# Patient Record
Sex: MALE | Race: OTHER | NOT HISPANIC OR LATINO | URBAN - METROPOLITAN AREA
[De-identification: names, ages, dates, MRNs, and addresses within clinical notes are randomized per-mention and may not be internally consistent; named-entity substitution may affect disease eponyms.]

---

## 2017-07-13 ENCOUNTER — EMERGENCY (EMERGENCY)
Facility: HOSPITAL | Age: 22
LOS: 1 days | Discharge: HOME | End: 2017-07-13
Attending: EMERGENCY MEDICINE
Payer: MEDICAID

## 2017-07-13 VITALS
SYSTOLIC BLOOD PRESSURE: 131 MMHG | RESPIRATION RATE: 17 BRPM | HEART RATE: 78 BPM | WEIGHT: 139.99 LBS | OXYGEN SATURATION: 97 % | DIASTOLIC BLOOD PRESSURE: 74 MMHG | HEIGHT: 71 IN | TEMPERATURE: 98 F

## 2017-07-13 DIAGNOSIS — S82.899A OTHER FRACTURE OF UNSPECIFIED LOWER LEG, INITIAL ENCOUNTER FOR CLOSED FRACTURE: ICD-10-CM

## 2017-07-13 PROCEDURE — 73630 X-RAY EXAM OF FOOT: CPT | Mod: 26,LT

## 2017-07-13 PROCEDURE — 99284 EMERGENCY DEPT VISIT MOD MDM: CPT | Mod: 25

## 2017-07-13 PROCEDURE — 73610 X-RAY EXAM OF ANKLE: CPT | Mod: 26,LT

## 2017-07-13 PROCEDURE — 73630 X-RAY EXAM OF FOOT: CPT

## 2017-07-13 PROCEDURE — 29515 APPLICATION SHORT LEG SPLINT: CPT | Mod: LT

## 2017-07-13 PROCEDURE — 29515 APPLICATION SHORT LEG SPLINT: CPT

## 2017-07-13 PROCEDURE — 73590 X-RAY EXAM OF LOWER LEG: CPT | Mod: 26,LT

## 2017-07-13 PROCEDURE — 73610 X-RAY EXAM OF ANKLE: CPT

## 2017-07-13 PROCEDURE — 73590 X-RAY EXAM OF LOWER LEG: CPT

## 2017-07-14 NOTE — ED PROVIDER NOTE - OBJECTIVE STATEMENT
22 y/o M pt with no PMHx and no PSHx presents to ED c/o L ankle pain s/p injury to L ankle x1 day ago. Pt states he was playing basketball when someone jumped into the pt, hitting the lateral aspect of the pt's L leg, and causing the pt to fall towards his R side via a stress eversion-type of mechanism. Pt reports severe L ankle pain with associated L ankle swelling and L ankle bruising; pt is unable to bear weight. Pt denies L knee pain, or any other complaints. NKDA.

## 2017-07-14 NOTE — ED PROVIDER NOTE - LOWER EXTREMITY EXAM, LEFT
full active ROM of all joints in L lower extremity except L ankle; no tenderness to L fibular head; tenderness to distal fibula, L mid foot, and L medial malleolus; neurovascularly intact, capillary refill <2 seconds
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
## 2017-07-17 DIAGNOSIS — S82.492A OTHER FRACTURE OF SHAFT OF LEFT FIBULA, INITIAL ENCOUNTER FOR CLOSED FRACTURE: ICD-10-CM

## 2017-07-17 DIAGNOSIS — S82.52XA DISPLACED FRACTURE OF MEDIAL MALLEOLUS OF LEFT TIBIA, INITIAL ENCOUNTER FOR CLOSED FRACTURE: ICD-10-CM
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
## 2017-07-23 ENCOUNTER — INPATIENT (INPATIENT)
Facility: HOSPITAL | Age: 22
LOS: 0 days | Discharge: HOME | DRG: 494 | End: 2017-07-24
Attending: ORTHOPAEDIC SURGERY | Admitting: ORTHOPAEDIC SURGERY
Payer: MEDICAID

## 2017-07-23 VITALS
WEIGHT: 149.91 LBS | RESPIRATION RATE: 16 BRPM | HEART RATE: 70 BPM | DIASTOLIC BLOOD PRESSURE: 69 MMHG | OXYGEN SATURATION: 99 % | TEMPERATURE: 98 F | HEIGHT: 71 IN | SYSTOLIC BLOOD PRESSURE: 97 MMHG

## 2017-07-23 DIAGNOSIS — S82.899A OTHER FRACTURE OF UNSPECIFIED LOWER LEG, INITIAL ENCOUNTER FOR CLOSED FRACTURE: ICD-10-CM

## 2017-07-23 LAB
ALBUMIN SERPL ELPH-MCNC: 4.2 G/DL — SIGNIFICANT CHANGE UP (ref 3.5–5)
ALP SERPL-CCNC: 76 U/L — SIGNIFICANT CHANGE UP (ref 40–120)
ALT FLD-CCNC: 20 U/L DA — SIGNIFICANT CHANGE UP (ref 10–60)
ANION GAP SERPL CALC-SCNC: 7 MMOL/L — SIGNIFICANT CHANGE UP (ref 5–17)
APTT BLD: 31.2 SEC — SIGNIFICANT CHANGE UP (ref 27.5–37.4)
AST SERPL-CCNC: 17 U/L — SIGNIFICANT CHANGE UP (ref 10–40)
BASOPHILS # BLD AUTO: 0.1 K/UL — SIGNIFICANT CHANGE UP (ref 0–0.2)
BASOPHILS NFR BLD AUTO: 0.9 % — SIGNIFICANT CHANGE UP (ref 0–2)
BILIRUB SERPL-MCNC: 0.4 MG/DL — SIGNIFICANT CHANGE UP (ref 0.2–1.2)
BLD GP AB SCN SERPL QL: SIGNIFICANT CHANGE UP
BUN SERPL-MCNC: 18 MG/DL — SIGNIFICANT CHANGE UP (ref 7–18)
CALCIUM SERPL-MCNC: 8.9 MG/DL — SIGNIFICANT CHANGE UP (ref 8.4–10.5)
CHLORIDE SERPL-SCNC: 106 MMOL/L — SIGNIFICANT CHANGE UP (ref 96–108)
CO2 SERPL-SCNC: 26 MMOL/L — SIGNIFICANT CHANGE UP (ref 22–31)
CREAT SERPL-MCNC: 0.85 MG/DL — SIGNIFICANT CHANGE UP (ref 0.5–1.3)
EOSINOPHIL # BLD AUTO: 0.2 K/UL — SIGNIFICANT CHANGE UP (ref 0–0.5)
EOSINOPHIL NFR BLD AUTO: 2 % — SIGNIFICANT CHANGE UP (ref 0–6)
GLUCOSE SERPL-MCNC: 88 MG/DL — SIGNIFICANT CHANGE UP (ref 70–99)
HCT VFR BLD CALC: 41.8 % — SIGNIFICANT CHANGE UP (ref 39–50)
HGB BLD-MCNC: 14.6 G/DL — SIGNIFICANT CHANGE UP (ref 13–17)
INR BLD: 1.14 RATIO — SIGNIFICANT CHANGE UP (ref 0.88–1.16)
LYMPHOCYTES # BLD AUTO: 2.7 K/UL — SIGNIFICANT CHANGE UP (ref 1–3.3)
LYMPHOCYTES # BLD AUTO: 35.4 % — SIGNIFICANT CHANGE UP (ref 13–44)
MCHC RBC-ENTMCNC: 32.8 PG — SIGNIFICANT CHANGE UP (ref 27–34)
MCHC RBC-ENTMCNC: 35 GM/DL — SIGNIFICANT CHANGE UP (ref 32–36)
MCV RBC AUTO: 93.8 FL — SIGNIFICANT CHANGE UP (ref 80–100)
MONOCYTES # BLD AUTO: 0.5 K/UL — SIGNIFICANT CHANGE UP (ref 0–0.9)
MONOCYTES NFR BLD AUTO: 6.4 % — SIGNIFICANT CHANGE UP (ref 2–14)
NEUTROPHILS # BLD AUTO: 4.2 K/UL — SIGNIFICANT CHANGE UP (ref 1.8–7.4)
NEUTROPHILS NFR BLD AUTO: 55.3 % — SIGNIFICANT CHANGE UP (ref 43–77)
PLATELET # BLD AUTO: 220 K/UL — SIGNIFICANT CHANGE UP (ref 150–400)
POTASSIUM SERPL-MCNC: 4 MMOL/L — SIGNIFICANT CHANGE UP (ref 3.5–5.3)
POTASSIUM SERPL-SCNC: 4 MMOL/L — SIGNIFICANT CHANGE UP (ref 3.5–5.3)
PROT SERPL-MCNC: 7.5 G/DL — SIGNIFICANT CHANGE UP (ref 6–8.3)
PROTHROM AB SERPL-ACNC: 12.5 SEC — SIGNIFICANT CHANGE UP (ref 9.8–12.7)
RBC # BLD: 4.46 M/UL — SIGNIFICANT CHANGE UP (ref 4.2–5.8)
RBC # FLD: 12.2 % — SIGNIFICANT CHANGE UP (ref 10.3–14.5)
SODIUM SERPL-SCNC: 139 MMOL/L — SIGNIFICANT CHANGE UP (ref 135–145)
WBC # BLD: 7.7 K/UL — SIGNIFICANT CHANGE UP (ref 3.8–10.5)
WBC # FLD AUTO: 7.7 K/UL — SIGNIFICANT CHANGE UP (ref 3.8–10.5)

## 2017-07-23 PROCEDURE — 27829 TREAT LOWER LEG JOINT: CPT | Mod: AS

## 2017-07-23 PROCEDURE — 76000 FLUOROSCOPY <1 HR PHYS/QHP: CPT | Mod: 26

## 2017-07-23 PROCEDURE — 99285 EMERGENCY DEPT VISIT HI MDM: CPT

## 2017-07-23 PROCEDURE — 73610 X-RAY EXAM OF ANKLE: CPT | Mod: 26,LT

## 2017-07-23 PROCEDURE — 71020: CPT | Mod: 26

## 2017-07-23 RX ORDER — SODIUM CHLORIDE 9 MG/ML
1000 INJECTION INTRAMUSCULAR; INTRAVENOUS; SUBCUTANEOUS
Qty: 0 | Refills: 0 | Status: DISCONTINUED | OUTPATIENT
Start: 2017-07-23 | End: 2017-07-24

## 2017-07-23 RX ORDER — OXYCODONE HYDROCHLORIDE 5 MG/1
1 TABLET ORAL
Qty: 30 | Refills: 0 | OUTPATIENT
Start: 2017-07-23

## 2017-07-23 RX ORDER — OXYCODONE AND ACETAMINOPHEN 5; 325 MG/1; MG/1
1 TABLET ORAL EVERY 4 HOURS
Qty: 0 | Refills: 0 | Status: DISCONTINUED | OUTPATIENT
Start: 2017-07-23 | End: 2017-07-24

## 2017-07-23 RX ORDER — HYDROMORPHONE HYDROCHLORIDE 2 MG/ML
0.5 INJECTION INTRAMUSCULAR; INTRAVENOUS; SUBCUTANEOUS
Qty: 0 | Refills: 0 | Status: DISCONTINUED | OUTPATIENT
Start: 2017-07-23 | End: 2017-07-23

## 2017-07-23 RX ORDER — ONDANSETRON 8 MG/1
4 TABLET, FILM COATED ORAL ONCE
Qty: 0 | Refills: 0 | Status: COMPLETED | OUTPATIENT
Start: 2017-07-23 | End: 2017-07-23

## 2017-07-23 RX ORDER — ONDANSETRON 8 MG/1
4 TABLET, FILM COATED ORAL ONCE
Qty: 0 | Refills: 0 | Status: DISCONTINUED | OUTPATIENT
Start: 2017-07-23 | End: 2017-07-23

## 2017-07-23 RX ORDER — MORPHINE SULFATE 50 MG/1
4 CAPSULE, EXTENDED RELEASE ORAL ONCE
Qty: 0 | Refills: 0 | Status: DISCONTINUED | OUTPATIENT
Start: 2017-07-23 | End: 2017-07-23

## 2017-07-23 RX ORDER — OXYCODONE AND ACETAMINOPHEN 5; 325 MG/1; MG/1
2 TABLET ORAL EVERY 4 HOURS
Qty: 0 | Refills: 0 | Status: DISCONTINUED | OUTPATIENT
Start: 2017-07-23 | End: 2017-07-24

## 2017-07-23 RX ORDER — MORPHINE SULFATE 50 MG/1
2 CAPSULE, EXTENDED RELEASE ORAL ONCE
Qty: 0 | Refills: 0 | Status: DISCONTINUED | OUTPATIENT
Start: 2017-07-23 | End: 2017-07-23

## 2017-07-23 RX ORDER — CEPHALEXIN 500 MG
1 CAPSULE ORAL
Qty: 20 | Refills: 0 | OUTPATIENT
Start: 2017-07-23 | End: 2017-07-28

## 2017-07-23 RX ORDER — CEFAZOLIN SODIUM 1 G
1000 VIAL (EA) INJECTION ONCE
Qty: 0 | Refills: 0 | Status: COMPLETED | OUTPATIENT
Start: 2017-07-23 | End: 2017-07-23

## 2017-07-23 RX ORDER — ENOXAPARIN SODIUM 100 MG/ML
40 INJECTION SUBCUTANEOUS DAILY
Qty: 0 | Refills: 0 | Status: DISCONTINUED | OUTPATIENT
Start: 2017-07-23 | End: 2017-07-24

## 2017-07-23 RX ORDER — SODIUM CHLORIDE 9 MG/ML
1000 INJECTION, SOLUTION INTRAVENOUS
Qty: 0 | Refills: 0 | Status: DISCONTINUED | OUTPATIENT
Start: 2017-07-23 | End: 2017-07-23

## 2017-07-23 RX ADMIN — MORPHINE SULFATE 2 MILLIGRAM(S): 50 CAPSULE, EXTENDED RELEASE ORAL at 21:33

## 2017-07-23 RX ADMIN — OXYCODONE AND ACETAMINOPHEN 2 TABLET(S): 5; 325 TABLET ORAL at 19:50

## 2017-07-23 RX ADMIN — OXYCODONE AND ACETAMINOPHEN 2 TABLET(S): 5; 325 TABLET ORAL at 23:44

## 2017-07-23 RX ADMIN — SODIUM CHLORIDE 125 MILLILITER(S): 9 INJECTION INTRAMUSCULAR; INTRAVENOUS; SUBCUTANEOUS at 09:58

## 2017-07-23 RX ADMIN — Medication 100 MILLIGRAM(S): at 18:23

## 2017-07-23 RX ADMIN — OXYCODONE AND ACETAMINOPHEN 2 TABLET(S): 5; 325 TABLET ORAL at 19:00

## 2017-07-23 RX ADMIN — ENOXAPARIN SODIUM 40 MILLIGRAM(S): 100 INJECTION SUBCUTANEOUS at 23:13

## 2017-07-23 RX ADMIN — MORPHINE SULFATE 2 MILLIGRAM(S): 50 CAPSULE, EXTENDED RELEASE ORAL at 22:00

## 2017-07-23 NOTE — ED ADULT NURSE NOTE - OBJECTIVE STATEMENT
axox3 ambulated with scratches presented with c/o Lt ankle pain S/P basketball injury 10 days ago  ho swelling or calf tenderness noted  B/W initiated

## 2017-07-23 NOTE — ASU DISCHARGE PLAN (ADULT/PEDIATRIC). - MEDICATION SUMMARY - MEDICATIONS TO STOP TAKING
I will STOP taking the medications listed below when I get home from the hospital:    Naprosyn 500 mg oral tablet  -- 1 tab(s) by mouth every 12 hours  -- Check with your doctor before becoming pregnant.  May cause drowsiness or dizziness.  Obtain medical advice before taking any non-prescription drugs as some may affect the action of this medication.  Take with food or milk.

## 2017-07-23 NOTE — ASU DISCHARGE PLAN (ADULT/PEDIATRIC). - MEDICATION SUMMARY - MEDICATIONS TO TAKE
I will START or STAY ON the medications listed below when I get home from the hospital:    acetaminophen-oxycodone 325 mg-5 mg oral tablet  -- 1 tab(s) by mouth every 6 hours MDD:6  -- Caution federal law prohibits the transfer of this drug to any person other  than the person for whom it was prescribed.  May cause drowsiness.  Alcohol may intensify this effect.  Use care when operating dangerous machinery.  This prescription cannot be refilled.  This product contains acetaminophen.  Do not use  with any other product containing acetaminophen to prevent possible liver damage.  Using more of this medication than prescribed may cause serious breathing problems.    -- Indication: For pain    cephalexin 500 mg oral capsule  -- 1 cap(s) by mouth 4 times a day  -- Finish all this medication unless otherwise directed by prescriber.    -- Indication: For Abx

## 2017-07-23 NOTE — ED ADULT TRIAGE NOTE - CHIEF COMPLAINT QUOTE
left ankle pain, injured while playing basket ball 10 days ago. was told by orthopedic to come here today for surgery.

## 2017-07-23 NOTE — CONSULT NOTE ADULT - SUBJECTIVE AND OBJECTIVE BOX
HPI: Patient is a 22 y/o M pt who presents to ED with c/o left ankle pain/injury. Patient injured his left ankle while playing basketball about 10 days ago. tamia. Pt was seen in the ED and X rays revealed a displaced left ankle fracture. 	    PAST MEDICAL & SURGICAL HISTORY:  No pertinent past medical history  No significant past surgical history    Review of systems: Non Contributory    MEDICATIONS  (STANDING):  morphine  - Injectable 4 milliGRAM(s) IV Push Once  ondansetron Injectable 4 milliGRAM(s) IV Push once  sodium chloride 0.9%. 1000 milliLiter(s) (125 mL/Hr) IV Continuous <Continuous>    Allergies: No known Allergies    Vital Signs Last 24 Hrs  T(C): 36.4 (23 Jul 2017 08:01), Max: 36.4 (23 Jul 2017 08:01)  T(F): 97.6 (23 Jul 2017 08:01), Max: 97.6 (23 Jul 2017 08:01)  HR: 70 (23 Jul 2017 08:01) (70 - 70)  BP: 97/69 (23 Jul 2017 08:01) (97/69 - 97/69)  BP(mean): --  RR: 16 (23 Jul 2017 08:01) (16 - 16)  SpO2: 99% (23 Jul 2017 08:01) (99% - 99%)    Physical Examination:     Musculoskeletal: Left ankle: positive pain to palpation of both medial a and lateral malleolar area. swelling, ecchymosis, decreased range of motion.     Neurovascularly Intact    LABS:                        14.6   7.7   )-----------( 220      ( 23 Jul 2017 09:05 )             41.8     07-23    139  |  106  |  18  ----------------------------<  88  4.0   |  26  |  0.85    Ca    8.9      23 Jul 2017 09:05    TPro  7.5  /  Alb  4.2  /  TBili  0.4  /  DBili  x   /  AST  17  /  ALT  20  /  AlkPhos  76  07-23    PT/INR - ( 23 Jul 2017 09:05 )   PT: 12.5 sec;   INR: 1.14 ratio         PTT - ( 23 Jul 2017 09:05 )  PTT:31.2 sec      RADIOLOGY & ADDITIONAL STUDIES: Left ankle: Displaced distal fibula fracture, Syndesmosis widening, Medial malleolus avulsion fracture    ASSESSMENT: Bimalleolar displaced ankle fracture    PLAN/RECOMMENDATION: ORIF is recommended. Risks, benefits and complications were discussed and explained.     FOLLOW UP: with office, after surgery.

## 2017-07-24 VITALS — OXYGEN SATURATION: 100 % | DIASTOLIC BLOOD PRESSURE: 70 MMHG | HEART RATE: 78 BPM | SYSTOLIC BLOOD PRESSURE: 115 MMHG

## 2017-07-24 PROCEDURE — 71046 X-RAY EXAM CHEST 2 VIEWS: CPT

## 2017-07-24 PROCEDURE — 80053 COMPREHEN METABOLIC PANEL: CPT

## 2017-07-24 PROCEDURE — C1889: CPT

## 2017-07-24 PROCEDURE — 85610 PROTHROMBIN TIME: CPT

## 2017-07-24 PROCEDURE — 99285 EMERGENCY DEPT VISIT HI MDM: CPT | Mod: 25

## 2017-07-24 PROCEDURE — 86850 RBC ANTIBODY SCREEN: CPT

## 2017-07-24 PROCEDURE — 85730 THROMBOPLASTIN TIME PARTIAL: CPT

## 2017-07-24 PROCEDURE — 86900 BLOOD TYPING SEROLOGIC ABO: CPT

## 2017-07-24 PROCEDURE — 85027 COMPLETE CBC AUTOMATED: CPT

## 2017-07-24 PROCEDURE — 86901 BLOOD TYPING SEROLOGIC RH(D): CPT

## 2017-07-24 PROCEDURE — C1713: CPT

## 2017-07-24 PROCEDURE — 73610 X-RAY EXAM OF ANKLE: CPT

## 2017-07-24 PROCEDURE — 76000 FLUOROSCOPY <1 HR PHYS/QHP: CPT

## 2017-07-24 PROCEDURE — 36415 COLL VENOUS BLD VENIPUNCTURE: CPT

## 2017-07-24 RX ORDER — OXYCODONE HYDROCHLORIDE 5 MG/1
1 TABLET ORAL
Qty: 30 | Refills: 0 | OUTPATIENT
Start: 2017-07-24

## 2017-07-24 RX ADMIN — OXYCODONE AND ACETAMINOPHEN 2 TABLET(S): 5; 325 TABLET ORAL at 04:06

## 2017-07-24 RX ADMIN — OXYCODONE AND ACETAMINOPHEN 2 TABLET(S): 5; 325 TABLET ORAL at 08:13

## 2017-07-24 RX ADMIN — OXYCODONE AND ACETAMINOPHEN 2 TABLET(S): 5; 325 TABLET ORAL at 14:59

## 2017-07-24 RX ADMIN — OXYCODONE AND ACETAMINOPHEN 2 TABLET(S): 5; 325 TABLET ORAL at 12:00

## 2017-07-24 RX ADMIN — OXYCODONE AND ACETAMINOPHEN 2 TABLET(S): 5; 325 TABLET ORAL at 05:00

## 2017-07-24 RX ADMIN — OXYCODONE AND ACETAMINOPHEN 2 TABLET(S): 5; 325 TABLET ORAL at 08:42

## 2017-07-24 RX ADMIN — OXYCODONE AND ACETAMINOPHEN 2 TABLET(S): 5; 325 TABLET ORAL at 00:45

## 2017-07-24 NOTE — DISCHARGE NOTE ADULT - CARE PROVIDER_API CALL
Randy Bone), Orthopaedic Surgery  [address] [place] [address]  Phone: [phone]  Fax: [phone]

## 2017-07-24 NOTE — DISCHARGE NOTE ADULT - CARE PLAN
Principal Discharge DX:	Ankle fracture  Goal:	Increase mobility  Instructions for follow-up, activity and diet:	Wound and splint assessment

## 2017-07-24 NOTE — DISCHARGE NOTE ADULT - PATIENT PORTAL LINK FT
“You can access the [facility] Patient Portal, offered by [facility], by registering with the following website: [email or web address]”

## 2017-07-26 DIAGNOSIS — S93.432A SPRAIN OF TIBIOFIBULAR LIGAMENT OF LEFT ANKLE, INITIAL ENCOUNTER: ICD-10-CM

## 2017-07-26 DIAGNOSIS — S82.842A DISPLACED BIMALLEOLAR FRACTURE OF LEFT LOWER LEG, INITIAL ENCOUNTER FOR CLOSED FRACTURE: ICD-10-CM
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
## 2017-09-18 ENCOUNTER — INPATIENT (INPATIENT)
Facility: HOSPITAL | Age: 22
LOS: 5 days | Discharge: HOME HEALTH | DRG: 940 | End: 2017-09-24
Attending: ORTHOPAEDIC SURGERY | Admitting: ORTHOPAEDIC SURGERY
Payer: MEDICAID

## 2017-09-18 VITALS
SYSTOLIC BLOOD PRESSURE: 124 MMHG | WEIGHT: 154.98 LBS | RESPIRATION RATE: 16 BRPM | DIASTOLIC BLOOD PRESSURE: 78 MMHG | OXYGEN SATURATION: 97 % | HEART RATE: 78 BPM | TEMPERATURE: 97 F

## 2017-09-18 DIAGNOSIS — T81.31XD DISRUPTION OF EXTERNAL OPERATION (SURGICAL) WOUND, NOT ELSEWHERE CLASSIFIED, SUBSEQUENT ENCOUNTER: ICD-10-CM

## 2017-09-18 LAB
ALBUMIN SERPL ELPH-MCNC: 4.3 G/DL — SIGNIFICANT CHANGE UP (ref 3.5–5)
ALP SERPL-CCNC: 88 U/L — SIGNIFICANT CHANGE UP (ref 40–120)
ALT FLD-CCNC: 14 U/L DA — SIGNIFICANT CHANGE UP (ref 10–60)
ANION GAP SERPL CALC-SCNC: 7 MMOL/L — SIGNIFICANT CHANGE UP (ref 5–17)
APTT BLD: 30.1 SEC — SIGNIFICANT CHANGE UP (ref 27.5–37.4)
AST SERPL-CCNC: 12 U/L — SIGNIFICANT CHANGE UP (ref 10–40)
BASOPHILS # BLD AUTO: 0.1 K/UL — SIGNIFICANT CHANGE UP (ref 0–0.2)
BASOPHILS NFR BLD AUTO: 0.9 % — SIGNIFICANT CHANGE UP (ref 0–2)
BILIRUB SERPL-MCNC: 0.4 MG/DL — SIGNIFICANT CHANGE UP (ref 0.2–1.2)
BUN SERPL-MCNC: 11 MG/DL — SIGNIFICANT CHANGE UP (ref 7–18)
CALCIUM SERPL-MCNC: 9.4 MG/DL — SIGNIFICANT CHANGE UP (ref 8.4–10.5)
CHLORIDE SERPL-SCNC: 105 MMOL/L — SIGNIFICANT CHANGE UP (ref 96–108)
CO2 SERPL-SCNC: 28 MMOL/L — SIGNIFICANT CHANGE UP (ref 22–31)
CREAT SERPL-MCNC: 0.97 MG/DL — SIGNIFICANT CHANGE UP (ref 0.5–1.3)
EOSINOPHIL # BLD AUTO: 0.1 K/UL — SIGNIFICANT CHANGE UP (ref 0–0.5)
EOSINOPHIL NFR BLD AUTO: 1.3 % — SIGNIFICANT CHANGE UP (ref 0–6)
GLUCOSE SERPL-MCNC: 80 MG/DL — SIGNIFICANT CHANGE UP (ref 70–99)
HCT VFR BLD CALC: 46.1 % — SIGNIFICANT CHANGE UP (ref 39–50)
HGB BLD-MCNC: 15.4 G/DL — SIGNIFICANT CHANGE UP (ref 13–17)
INR BLD: 1.04 RATIO — SIGNIFICANT CHANGE UP (ref 0.88–1.16)
LYMPHOCYTES # BLD AUTO: 2.5 K/UL — SIGNIFICANT CHANGE UP (ref 1–3.3)
LYMPHOCYTES # BLD AUTO: 30.5 % — SIGNIFICANT CHANGE UP (ref 13–44)
MCHC RBC-ENTMCNC: 31.6 PG — SIGNIFICANT CHANGE UP (ref 27–34)
MCHC RBC-ENTMCNC: 33.5 GM/DL — SIGNIFICANT CHANGE UP (ref 32–36)
MCV RBC AUTO: 94.4 FL — SIGNIFICANT CHANGE UP (ref 80–100)
MONOCYTES # BLD AUTO: 0.8 K/UL — SIGNIFICANT CHANGE UP (ref 0–0.9)
MONOCYTES NFR BLD AUTO: 9.7 % — SIGNIFICANT CHANGE UP (ref 2–14)
NEUTROPHILS # BLD AUTO: 4.8 K/UL — SIGNIFICANT CHANGE UP (ref 1.8–7.4)
NEUTROPHILS NFR BLD AUTO: 57.6 % — SIGNIFICANT CHANGE UP (ref 43–77)
PLATELET # BLD AUTO: 201 K/UL — SIGNIFICANT CHANGE UP (ref 150–400)
POTASSIUM SERPL-MCNC: 3.9 MMOL/L — SIGNIFICANT CHANGE UP (ref 3.5–5.3)
POTASSIUM SERPL-SCNC: 3.9 MMOL/L — SIGNIFICANT CHANGE UP (ref 3.5–5.3)
PROT SERPL-MCNC: 8.5 G/DL — HIGH (ref 6–8.3)
PROTHROM AB SERPL-ACNC: 11.4 SEC — SIGNIFICANT CHANGE UP (ref 9.8–12.7)
RBC # BLD: 4.88 M/UL — SIGNIFICANT CHANGE UP (ref 4.2–5.8)
RBC # FLD: 11.5 % — SIGNIFICANT CHANGE UP (ref 10.3–14.5)
SODIUM SERPL-SCNC: 140 MMOL/L — SIGNIFICANT CHANGE UP (ref 135–145)
WBC # BLD: 8.3 K/UL — SIGNIFICANT CHANGE UP (ref 3.8–10.5)
WBC # FLD AUTO: 8.3 K/UL — SIGNIFICANT CHANGE UP (ref 3.8–10.5)

## 2017-09-18 PROCEDURE — 73610 X-RAY EXAM OF ANKLE: CPT | Mod: 26,LT

## 2017-09-18 PROCEDURE — 99285 EMERGENCY DEPT VISIT HI MDM: CPT

## 2017-09-18 RX ORDER — IBUPROFEN 200 MG
600 TABLET ORAL ONCE
Qty: 0 | Refills: 0 | Status: DISCONTINUED | OUTPATIENT
Start: 2017-09-18 | End: 2017-09-18

## 2017-09-18 RX ORDER — OXYCODONE AND ACETAMINOPHEN 5; 325 MG/1; MG/1
1 TABLET ORAL EVERY 4 HOURS
Qty: 0 | Refills: 0 | Status: DISCONTINUED | OUTPATIENT
Start: 2017-09-18 | End: 2017-09-19

## 2017-09-18 RX ORDER — VANCOMYCIN HCL 1 G
1000 VIAL (EA) INTRAVENOUS ONCE
Qty: 0 | Refills: 0 | Status: COMPLETED | OUTPATIENT
Start: 2017-09-18 | End: 2017-09-18

## 2017-09-18 RX ORDER — SODIUM CHLORIDE 9 MG/ML
1000 INJECTION, SOLUTION INTRAVENOUS
Qty: 0 | Refills: 0 | Status: DISCONTINUED | OUTPATIENT
Start: 2017-09-18 | End: 2017-09-19

## 2017-09-18 RX ORDER — OXYCODONE AND ACETAMINOPHEN 5; 325 MG/1; MG/1
2 TABLET ORAL EVERY 6 HOURS
Qty: 0 | Refills: 0 | Status: DISCONTINUED | OUTPATIENT
Start: 2017-09-18 | End: 2017-09-19

## 2017-09-18 RX ADMIN — Medication 250 MILLIGRAM(S): at 17:05

## 2017-09-18 NOTE — ED PROVIDER NOTE - ATTENDING CONTRIBUTION TO CARE
Tete - 23yo M w ORIF of the L distal fibula 2 mos ago, few days of pain/swelling/erythema and pus DC from surgical wound. Sent in buy Ortho for wash-out. 1cm dehiscence on exam, diffuse erythema and edema to L leg, lateral aspect. Will get labs, Xrays, abx, admit

## 2017-09-18 NOTE — H&P ADULT - HISTORY OF PRESENT ILLNESS
23y/o M s/p orif left ankle 7/23/17 presents today with left ankle drainage from lateral incision for past 5 days. Patient states he noticed a scab on his outer left ankle at incision area last Wednesday which came off. When scab came off, patient states pus and blood started to drain. He then went to orthopedic office next day who advised patient to come to ER. Patient denies any fall or trauma since surgery. He states he has been walking with cam walker boot and crutches. Denies any fever/chills, N/V/D, paresthesias.

## 2017-09-18 NOTE — H&P ADULT - PROBLEM SELECTOR PLAN 1
1. pain management  2. Recommend I&D of Left Ankle wound  3. For OR today  4. Keep NPO with IV Fluids  5. Consent obtained  6. Case discussed with Dr. Bone

## 2017-09-18 NOTE — ED PROVIDER NOTE - MUSCULOSKELETAL MINIMAL EXAM
RANGE OF MOTION LIMITED/motor intact/1.0 ulceration to L lateral malleolus no hardware exposure, warm to touch, no heat, streaking, minimal purulent drainage/TENDERNESS motor intact/1.0 ulceration to L lateral malleolus no hardware exposure, warm/hot to touch, no streaking, minimal purulent drainage with swelling/TENDERNESS/RANGE OF MOTION LIMITED

## 2017-09-18 NOTE — ED PROVIDER NOTE - MEDICAL DECISION MAKING DETAILS
23y/o male, sent to ED for admission for L lateral malleolus pain/wound dehiscences s/p ORIF 7/23/17; consulted with surgery PA and Dr. Bone, pt comfortable, labs and xray ordered.

## 2017-09-18 NOTE — ED PROVIDER NOTE - CADM POA URETHRAL CATHETER
I called patient to see if he could come in and see Dr. Roberto Paul on Monday 6/19/17 and to call Lindsay to set that up. No

## 2017-09-18 NOTE — ED PROVIDER NOTE - OBJECTIVE STATEMENT
21 y/o male, no significant pmhx, psx L ankle ORIF (7/23/17) sent to ED from Dr. Bone office for assessment s/p purulent drainage x1 week. Denies fever/chills, new trauma or any other concerns.

## 2017-09-18 NOTE — ED PROVIDER NOTE - SKIN WOUND DESCRIPTION
clean/DEVITALIZED TISSUE PRESENT/1.0 ulceration clean/DEVITALIZED TISSUE PRESENT/1.0 ulceration, warm/hot to touch minimal purulent drainage, TTP, no streaking, 2+ pulse

## 2017-09-18 NOTE — H&P ADULT - NSHPPHYSICALEXAM_GEN_ALL_CORE
Left Ankle: Incision on medial aspect healed. Wound dehiscence on distal aspect of lateral incision. Minimal pus drainage. No active bleeding. Swelling and erythema around lateral incision. Decreased sensation on dorsal and plantar aspect of foot. 2+ pulses in DP. ROM decreased due to pain.

## 2017-09-18 NOTE — ED ADULT NURSE NOTE - OBJECTIVE STATEMENT
AOX3 +ambulatory patient stated he got ORIF last july and patient stated site with purulent drainage x 1 week. Patient noted with redness and open wound denies any fevers

## 2017-09-18 NOTE — ED PROVIDER NOTE - CARE PLAN
Principal Discharge DX:	Wound dehiscence, surgical, subsequent encounter  Secondary Diagnosis:	Left ankle pain, unspecified chronicity

## 2017-09-19 PROCEDURE — 88300 SURGICAL PATH GROSS: CPT | Mod: 26

## 2017-09-19 RX ORDER — DOCUSATE SODIUM 100 MG
100 CAPSULE ORAL THREE TIMES A DAY
Qty: 0 | Refills: 0 | Status: DISCONTINUED | OUTPATIENT
Start: 2017-09-20 | End: 2017-09-24

## 2017-09-19 RX ORDER — SODIUM CHLORIDE 9 MG/ML
1000 INJECTION, SOLUTION INTRAVENOUS
Qty: 0 | Refills: 0 | Status: DISCONTINUED | OUTPATIENT
Start: 2017-09-19 | End: 2017-09-19

## 2017-09-19 RX ORDER — ONDANSETRON 8 MG/1
4 TABLET, FILM COATED ORAL EVERY 6 HOURS
Qty: 0 | Refills: 0 | Status: DISCONTINUED | OUTPATIENT
Start: 2017-09-19 | End: 2017-09-24

## 2017-09-19 RX ORDER — OXYCODONE HYDROCHLORIDE 5 MG/1
10 TABLET ORAL EVERY 6 HOURS
Qty: 0 | Refills: 0 | Status: DISCONTINUED | OUTPATIENT
Start: 2017-09-20 | End: 2017-09-24

## 2017-09-19 RX ORDER — ACETAMINOPHEN 500 MG
1000 TABLET ORAL ONCE
Qty: 0 | Refills: 0 | Status: COMPLETED | OUTPATIENT
Start: 2017-09-19 | End: 2017-09-19

## 2017-09-19 RX ORDER — ACETAMINOPHEN 500 MG
650 TABLET ORAL EVERY 6 HOURS
Qty: 0 | Refills: 0 | Status: DISCONTINUED | OUTPATIENT
Start: 2017-09-20 | End: 2017-09-24

## 2017-09-19 RX ORDER — OXYCODONE HYDROCHLORIDE 5 MG/1
5 TABLET ORAL EVERY 4 HOURS
Qty: 0 | Refills: 0 | Status: DISCONTINUED | OUTPATIENT
Start: 2017-09-20 | End: 2017-09-24

## 2017-09-19 RX ORDER — FERROUS SULFATE 325(65) MG
325 TABLET ORAL
Qty: 0 | Refills: 0 | Status: DISCONTINUED | OUTPATIENT
Start: 2017-09-20 | End: 2017-09-24

## 2017-09-19 RX ORDER — FOLIC ACID 0.8 MG
1 TABLET ORAL DAILY
Qty: 0 | Refills: 0 | Status: DISCONTINUED | OUTPATIENT
Start: 2017-09-20 | End: 2017-09-24

## 2017-09-19 RX ORDER — HYDROMORPHONE HYDROCHLORIDE 2 MG/ML
0.5 INJECTION INTRAMUSCULAR; INTRAVENOUS; SUBCUTANEOUS
Qty: 0 | Refills: 0 | Status: DISCONTINUED | OUTPATIENT
Start: 2017-09-19 | End: 2017-09-20

## 2017-09-19 RX ORDER — VANCOMYCIN HCL 1 G
1000 VIAL (EA) INTRAVENOUS EVERY 12 HOURS
Qty: 0 | Refills: 0 | Status: DISCONTINUED | OUTPATIENT
Start: 2017-09-20 | End: 2017-09-22

## 2017-09-19 RX ORDER — ENOXAPARIN SODIUM 100 MG/ML
40 INJECTION SUBCUTANEOUS EVERY 24 HOURS
Qty: 0 | Refills: 0 | Status: DISCONTINUED | OUTPATIENT
Start: 2017-09-20 | End: 2017-09-24

## 2017-09-19 RX ORDER — ASCORBIC ACID 60 MG
500 TABLET,CHEWABLE ORAL
Qty: 0 | Refills: 0 | Status: DISCONTINUED | OUTPATIENT
Start: 2017-09-20 | End: 2017-09-24

## 2017-09-19 RX ADMIN — HYDROMORPHONE HYDROCHLORIDE 0.5 MILLIGRAM(S): 2 INJECTION INTRAMUSCULAR; INTRAVENOUS; SUBCUTANEOUS at 23:56

## 2017-09-19 RX ADMIN — Medication 400 MILLIGRAM(S): at 23:56

## 2017-09-19 NOTE — PROGRESS NOTE ADULT - SUBJECTIVE AND OBJECTIVE BOX
Procedure: I&D of left ankle wound dehiscence (s/p ORIF 2 months ago) today                          15.4   8.3   )-----------( 201      ( 18 Sep 2017 17:08 )             46.1       PT/INR - ( 18 Sep 2017 17:08 )   PT: 11.4 sec;   INR: 1.04 ratio         PTT - ( 18 Sep 2017 17:08 )  PTT:30.1 sec    09-18    140  |  105  |  11  ----------------------------<  80  3.9   |  28  |  0.97    Ca    9.4      18 Sep 2017 17:08    TPro  8.5<H>  /  Alb  4.3  /  TBili  0.4  /  DBili  x   /  AST  12  /  ALT  14  /  AlkPhos  88  09-18        History and Physical Complete? [x] Yes [ ] No    Consent Signed [x] Yes [ ] No    Clearance on chart? [x] Yes [ ] No    NPO  [x ] Yes [ ] No  IVF [x ] Yes [ ] No  AntiCoag Held [x] Yes [ ] No    21 y/o MF, s/p ORIF Left ankle fracture, has wound dehiscence. For OR (I&D) today. Offers no acute compalints. Pain mild- well controlled. Left LE dressed, elevated onto 2 pillows Procedure: I&D of left ankle wound dehiscence (s/p ORIF 2 months ago) today                          15.4   8.3   )-----------( 201      ( 18 Sep 2017 17:08 )             46.1       PT/INR - ( 18 Sep 2017 17:08 )   PT: 11.4 sec;   INR: 1.04 ratio         PTT - ( 18 Sep 2017 17:08 )  PTT:30.1 sec    09-18    140  |  105  |  11  ----------------------------<  80  3.9   |  28  |  0.97    Ca    9.4      18 Sep 2017 17:08    TPro  8.5<H>  /  Alb  4.3  /  TBili  0.4  /  DBili  x   /  AST  12  /  ALT  14  /  AlkPhos  88  09-18        History and Physical Complete? [x] Yes [ ] No    Consent Signed [x] Yes [ ] No    Clearance on chart? [x] Yes [ ] No    NPO  [x ] Yes [ ] No  IVF [x ] Yes [ ] No  AntiCoag Held [x] Yes [ ] No    23 y/o M, s/p ORIF Left ankle fracture, has wound dehiscence. For OR (I&D) today. Offers no acute compalints. Pain mild- well controlled. Left LE dressed, elevated onto 2 pillows

## 2017-09-20 LAB
ANION GAP SERPL CALC-SCNC: 5 MMOL/L — SIGNIFICANT CHANGE UP (ref 5–17)
BUN SERPL-MCNC: 9 MG/DL — SIGNIFICANT CHANGE UP (ref 7–18)
CALCIUM SERPL-MCNC: 9.2 MG/DL — SIGNIFICANT CHANGE UP (ref 8.4–10.5)
CHLORIDE SERPL-SCNC: 103 MMOL/L — SIGNIFICANT CHANGE UP (ref 96–108)
CO2 SERPL-SCNC: 31 MMOL/L — SIGNIFICANT CHANGE UP (ref 22–31)
CREAT SERPL-MCNC: 0.99 MG/DL — SIGNIFICANT CHANGE UP (ref 0.5–1.3)
CRP SERPL-MCNC: 1.4 MG/DL — HIGH (ref 0–0.4)
GLUCOSE SERPL-MCNC: 77 MG/DL — SIGNIFICANT CHANGE UP (ref 70–99)
GRAM STN FLD: SIGNIFICANT CHANGE UP
HCT VFR BLD CALC: 44.6 % — SIGNIFICANT CHANGE UP (ref 39–50)
HGB BLD-MCNC: 16 G/DL — SIGNIFICANT CHANGE UP (ref 13–17)
MCHC RBC-ENTMCNC: 33.1 PG — SIGNIFICANT CHANGE UP (ref 27–34)
MCHC RBC-ENTMCNC: 35.8 GM/DL — SIGNIFICANT CHANGE UP (ref 32–36)
MCV RBC AUTO: 92.3 FL — SIGNIFICANT CHANGE UP (ref 80–100)
PLATELET # BLD AUTO: 212 K/UL — SIGNIFICANT CHANGE UP (ref 150–400)
POTASSIUM SERPL-MCNC: 3.8 MMOL/L — SIGNIFICANT CHANGE UP (ref 3.5–5.3)
POTASSIUM SERPL-SCNC: 3.8 MMOL/L — SIGNIFICANT CHANGE UP (ref 3.5–5.3)
RBC # BLD: 4.83 M/UL — SIGNIFICANT CHANGE UP (ref 4.2–5.8)
RBC # FLD: 11.6 % — SIGNIFICANT CHANGE UP (ref 10.3–14.5)
SODIUM SERPL-SCNC: 139 MMOL/L — SIGNIFICANT CHANGE UP (ref 135–145)
SPECIMEN SOURCE: SIGNIFICANT CHANGE UP
WBC # BLD: 9.8 K/UL — SIGNIFICANT CHANGE UP (ref 3.8–10.5)
WBC # FLD AUTO: 9.8 K/UL — SIGNIFICANT CHANGE UP (ref 3.8–10.5)

## 2017-09-20 PROCEDURE — 99233 SBSQ HOSP IP/OBS HIGH 50: CPT

## 2017-09-20 RX ADMIN — OXYCODONE HYDROCHLORIDE 10 MILLIGRAM(S): 5 TABLET ORAL at 10:13

## 2017-09-20 RX ADMIN — Medication 500 MILLIGRAM(S): at 17:00

## 2017-09-20 RX ADMIN — OXYCODONE HYDROCHLORIDE 10 MILLIGRAM(S): 5 TABLET ORAL at 11:01

## 2017-09-20 RX ADMIN — Medication 325 MILLIGRAM(S): at 17:00

## 2017-09-20 RX ADMIN — ONDANSETRON 4 MILLIGRAM(S): 8 TABLET, FILM COATED ORAL at 00:01

## 2017-09-20 RX ADMIN — Medication 1 TABLET(S): at 11:37

## 2017-09-20 RX ADMIN — Medication 250 MILLIGRAM(S): at 17:01

## 2017-09-20 RX ADMIN — Medication 1 MILLIGRAM(S): at 11:37

## 2017-09-20 RX ADMIN — Medication 325 MILLIGRAM(S): at 11:37

## 2017-09-20 RX ADMIN — Medication 1000 MILLIGRAM(S): at 00:11

## 2017-09-20 RX ADMIN — Medication 250 MILLIGRAM(S): at 05:22

## 2017-09-20 RX ADMIN — Medication 500 MILLIGRAM(S): at 05:22

## 2017-09-20 RX ADMIN — ENOXAPARIN SODIUM 40 MILLIGRAM(S): 100 INJECTION SUBCUTANEOUS at 11:37

## 2017-09-20 RX ADMIN — OXYCODONE HYDROCHLORIDE 10 MILLIGRAM(S): 5 TABLET ORAL at 18:12

## 2017-09-20 RX ADMIN — OXYCODONE HYDROCHLORIDE 5 MILLIGRAM(S): 5 TABLET ORAL at 19:58

## 2017-09-20 RX ADMIN — Medication 100 MILLIGRAM(S): at 05:22

## 2017-09-20 RX ADMIN — HYDROMORPHONE HYDROCHLORIDE 0.5 MILLIGRAM(S): 2 INJECTION INTRAMUSCULAR; INTRAVENOUS; SUBCUTANEOUS at 00:11

## 2017-09-20 RX ADMIN — OXYCODONE HYDROCHLORIDE 5 MILLIGRAM(S): 5 TABLET ORAL at 20:30

## 2017-09-20 RX ADMIN — OXYCODONE HYDROCHLORIDE 10 MILLIGRAM(S): 5 TABLET ORAL at 16:35

## 2017-09-20 NOTE — PHYSICAL THERAPY INITIAL EVALUATION ADULT - PERTINENT HX OF CURRENT PROBLEM, REHAB EVAL
21y/o M s/p orif left ankle 7/23/17 presented to ED with left ankle drainage from lateral incision for past 5 days. Underwent incision and drainage yesterday.

## 2017-09-20 NOTE — PROGRESS NOTE ADULT - SUBJECTIVE AND OBJECTIVE BOX
Ortho Note POD# 1  Diagnosis: 22yMale S/p L ankle I&D and ARSLAN POD# 1, is observed and evaluated at bedside. Offers no acute complaints. Pain mild (4/10); well controlled. Awaiting PT for ambulation. Pt denies any CP/SOB/N/V/F/Chills/numbness/tingling.     Vital Signs Last 24 Hrs  T(C): 36.8 (20 Sep 2017 05:51), Max: 36.8 (20 Sep 2017 05:51)  T(F): 98.3 (20 Sep 2017 05:51), Max: 98.3 (20 Sep 2017 05:51)  HR: 104 (20 Sep 2017 09:31) (52 - 104)  BP: 127/76 (20 Sep 2017 09:31) (103/59 - 136/94)  BP(mean): 79 (20 Sep 2017 00:26) (79 - 106)  RR: 16 (20 Sep 2017 05:51) (12 - 16)  SpO2: 97% (20 Sep 2017 09:31) (90% - 100%)    PE: Gen: NAD, laying comfortably in bed  Left lower extremity:   In nl. alignment. Splint intact. Wound dressing clean/dry/intact. NV intact, moves all toes, BCR, No swelling, SILT.                           16.0   9.8   )-----------( 212      ( 20 Sep 2017 08:20 )             44.6   09-20    139  |  103  |  9   ----------------------------<  77  3.8   |  31  |  0.99    Ca    9.2      20 Sep 2017 08:20    TPro  8.5<H>  /  Alb  4.3  /  TBili  0.4  /  DBili  x   /  AST  12  /  ALT  14  /  AlkPhos  88  09-18      A/P : 22yMale S/p L ankle I&D and ARSLAN POD# 1  -    Pain control  -    DVT ppx: SCD on the unaffected side and lovenox  -    PT: NWB on LLE with crutches  -    Resume home meds  -   Incentive spirometer  -   C/w Antibx  -   Keep dressing clean/dry/intact  -   D/w - ID will f/u  -    Keep the operated extremity elevated onto 2 pillows  -    Case d/w Dr. Bone

## 2017-09-21 LAB — VANCOMYCIN TROUGH SERPL-MCNC: 5.1 UG/ML — LOW (ref 10–20)

## 2017-09-21 RX ADMIN — OXYCODONE HYDROCHLORIDE 5 MILLIGRAM(S): 5 TABLET ORAL at 08:25

## 2017-09-21 RX ADMIN — OXYCODONE HYDROCHLORIDE 10 MILLIGRAM(S): 5 TABLET ORAL at 20:14

## 2017-09-21 RX ADMIN — Medication 500 MILLIGRAM(S): at 18:02

## 2017-09-21 RX ADMIN — Medication 325 MILLIGRAM(S): at 09:02

## 2017-09-21 RX ADMIN — Medication 250 MILLIGRAM(S): at 05:17

## 2017-09-21 RX ADMIN — Medication 325 MILLIGRAM(S): at 12:16

## 2017-09-21 RX ADMIN — Medication 325 MILLIGRAM(S): at 18:02

## 2017-09-21 RX ADMIN — Medication 1 MILLIGRAM(S): at 12:16

## 2017-09-21 RX ADMIN — OXYCODONE HYDROCHLORIDE 10 MILLIGRAM(S): 5 TABLET ORAL at 00:04

## 2017-09-21 RX ADMIN — ENOXAPARIN SODIUM 40 MILLIGRAM(S): 100 INJECTION SUBCUTANEOUS at 12:16

## 2017-09-21 RX ADMIN — OXYCODONE HYDROCHLORIDE 10 MILLIGRAM(S): 5 TABLET ORAL at 21:20

## 2017-09-21 RX ADMIN — Medication 100 MILLIGRAM(S): at 13:50

## 2017-09-21 RX ADMIN — Medication 1 TABLET(S): at 12:16

## 2017-09-21 RX ADMIN — Medication 250 MILLIGRAM(S): at 17:55

## 2017-09-21 RX ADMIN — OXYCODONE HYDROCHLORIDE 5 MILLIGRAM(S): 5 TABLET ORAL at 09:00

## 2017-09-21 RX ADMIN — OXYCODONE HYDROCHLORIDE 10 MILLIGRAM(S): 5 TABLET ORAL at 00:48

## 2017-09-21 NOTE — CONSULT NOTE ADULT - ASSESSMENT
acute osteomyelitis of left ankle  infected hardware of left ankle - s/p removal  plan - PICC line  cont vanco 1 gm iv q 12 hrs for now until we have sensitivities of staph aureus

## 2017-09-21 NOTE — CONSULT NOTE ADULT - NEGATIVE GASTROINTESTINAL SYMPTOMS
no diarrhea/no change in bowel habits/no vomiting/no nausea no abdominal pain/no vomiting/no nausea/no diarrhea

## 2017-09-21 NOTE — CONSULT NOTE ADULT - SUBJECTIVE AND OBJECTIVE BOX
HPI:  23 y/o M s/p ORIF left ankle 7/23/17 presented with left ankle drainage from lateral incision x 1 week. Pt underwent I &D of left ankle and removal of hardware on 9/19/17. No Fevers and no WBC. Awaiting surgical Culture Results     PAST MEDICAL & SURGICAL HISTORY:  No pertinent past medical history  No significant past surgical history      No Known Allergies      Meds:  enoxaparin Injectable 40 milliGRAM(s) SubCutaneous every 24 hours  acetaminophen   Tablet 650 milliGRAM(s) Oral every 6 hours PRN  oxyCODONE    IR 10 milliGRAM(s) Oral every 6 hours PRN  oxyCODONE    IR 5 milliGRAM(s) Oral every 4 hours PRN  ondansetron Injectable 4 milliGRAM(s) IV Push every 6 hours PRN  docusate sodium 100 milliGRAM(s) Oral three times a day  ferrous    sulfate 325 milliGRAM(s) Oral three times a day with meals  folic acid 1 milliGRAM(s) Oral daily  multivitamin 1 Tablet(s) Oral daily  ascorbic acid 500 milliGRAM(s) Oral two times a day  vancomycin  IVPB 1000 milliGRAM(s) IV Intermittent every 12 hours      SOCIAL HISTORY:  Smoker:  YES / NO        PACK YEARS:                         WHEN QUIT?  ETOH use:  YES / NO               FREQUENCY / QUANTITY:  Ilicit Drug use:  YES / NO  Occupation:  Assisted device use (Cane / Walker):  Live with:    FAMILY HISTORY:      VITALS:  Vital Signs Last 24 Hrs  T(C): 36.9 (21 Sep 2017 05:13), Max: 36.9 (21 Sep 2017 05:13)  T(F): 98.4 (21 Sep 2017 05:13), Max: 98.4 (21 Sep 2017 05:13)  HR: 88 (21 Sep 2017 05:13) (74 - 88)  BP: 111/63 (21 Sep 2017 05:13) (109/74 - 111/63)  BP(mean): --  RR: 16 (21 Sep 2017 05:13) (16 - 16)  SpO2: 98% (21 Sep 2017 05:13) (98% - 98%)    LABS/DIAGNOSTIC TESTS:                          16.0   9.8   )-----------( 212      ( 20 Sep 2017 08:20 )             44.6     WBC Count: 9.8 K/uL (09-20 @ 08:20)  WBC Count: 8.3 K/uL (09-18 @ 17:08)      09-20    139  |  103  |  9   ----------------------------<  77  3.8   |  31  |  0.99    Ca    9.2      20 Sep 2017 08:20      Radiology:  < from: Xray Ankle Complete 3 Views, Left (09.18.17 @ 17:00) >    EXAM:  ANKLE LEFT (MINIMUM 3 V)                            PROCEDURE DATE:  09/18/2017          INTERPRETATION:  Left ankle. Patient has pain laterally.    Patient had a distal fibular fracture demonstrated on July 23 and had   subsequent fixation.    3 views obtained.    Extensive hardware transfixes the fibular fracture and there are signs of   healing and good alignment.    There is also horizontal pinning device going from the fibula across the   tibia.    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    Small fracture fragment off the medial malleolus could be from an old   event.    IMPRESSION: Signs of healing.                FLORENTIN KANG M.D., ATTENDING RADIOLOGIST  This document has been electronically signed. Sep 19 2017  9:49AM                < end of copied text > HPI:  23 y/o M s/p ORIF left ankle 7/23/17 presented with left ankle drainage from lateral incision x 1 week. Pt underwent I &D of left ankle and removal of hardware on 9/19/17. No Fevers and no WBC. Awaiting surgical Culture Results     PAST MEDICAL & SURGICAL HISTORY:  No pertinent past medical history  No significant past surgical history      No Known Allergies      Meds:  enoxaparin Injectable 40 milliGRAM(s) SubCutaneous every 24 hours  acetaminophen   Tablet 650 milliGRAM(s) Oral every 6 hours PRN  oxyCODONE    IR 10 milliGRAM(s) Oral every 6 hours PRN  oxyCODONE    IR 5 milliGRAM(s) Oral every 4 hours PRN  ondansetron Injectable 4 milliGRAM(s) IV Push every 6 hours PRN  docusate sodium 100 milliGRAM(s) Oral three times a day  ferrous    sulfate 325 milliGRAM(s) Oral three times a day with meals  folic acid 1 milliGRAM(s) Oral daily  multivitamin 1 Tablet(s) Oral daily  ascorbic acid 500 milliGRAM(s) Oral two times a day  vancomycin  IVPB 1000 milliGRAM(s) IV Intermittent every 12 hours      SOCIAL HISTORY:  Smoker:  YES, some day smoker about 1/2 pack  ETOH use:  Socially    VITALS:  Vital Signs Last 24 Hrs  T(C): 36.9 (21 Sep 2017 05:13), Max: 36.9 (21 Sep 2017 05:13)  T(F): 98.4 (21 Sep 2017 05:13), Max: 98.4 (21 Sep 2017 05:13)  HR: 88 (21 Sep 2017 05:13) (74 - 88)  BP: 111/63 (21 Sep 2017 05:13) (109/74 - 111/63)  BP(mean): --  RR: 16 (21 Sep 2017 05:13) (16 - 16)  SpO2: 98% (21 Sep 2017 05:13) (98% - 98%)    LABS/DIAGNOSTIC TESTS:                          16.0   9.8   )-----------( 212      ( 20 Sep 2017 08:20 )             44.6     WBC Count: 9.8 K/uL (09-20 @ 08:20)  WBC Count: 8.3 K/uL (09-18 @ 17:08)      09-20    139  |  103  |  9   ----------------------------<  77  3.8   |  31  |  0.99    Ca    9.2      20 Sep 2017 08:20      Radiology:  < from: Xray Ankle Complete 3 Views, Left (09.18.17 @ 17:00) >    EXAM:  ANKLE LEFT (MINIMUM 3 V)                            PROCEDURE DATE:  09/18/2017          INTERPRETATION:  Left ankle. Patient has pain laterally.    Patient had a distal fibular fracture demonstrated on July 23 and had   subsequent fixation.    3 views obtained.    Extensive hardware transfixes the fibular fracture and there are signs of   healing and good alignment.    There is also horizontal pinning device going from the fibula across the   tibia.    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    Small fracture fragment off the medial malleolus could be from an old   event.    IMPRESSION: Signs of healing.                FLORENTIN KANG M.D., ATTENDING RADIOLOGIST  This document has been electronically signed. Sep 19 2017  9:49AM                < end of copied text > HPI:  23 y/o M s/p ORIF left ankle 7/23/17 presented with left ankle drainage from lateral incision x 1 week. Pt underwent I &D of left ankle and removal of hardware on 9/19/17. No Fevers and no WBC. Awaiting surgical Culture Results final  Culture - Surgical Swab (09.20.17 @ 12:05)    Specimen Source: .Surgical Swab Left Ankle Culture #3    Culture Results:   Numerous Staphylococcus aureus        PAST MEDICAL & SURGICAL HISTORY:  No pertinent past medical history  No significant past surgical history      No Known Allergies      Meds:  enoxaparin Injectable 40 milliGRAM(s) SubCutaneous every 24 hours  acetaminophen   Tablet 650 milliGRAM(s) Oral every 6 hours PRN  oxyCODONE    IR 10 milliGRAM(s) Oral every 6 hours PRN  oxyCODONE    IR 5 milliGRAM(s) Oral every 4 hours PRN  ondansetron Injectable 4 milliGRAM(s) IV Push every 6 hours PRN  docusate sodium 100 milliGRAM(s) Oral three times a day  ferrous    sulfate 325 milliGRAM(s) Oral three times a day with meals  folic acid 1 milliGRAM(s) Oral daily  multivitamin 1 Tablet(s) Oral daily  ascorbic acid 500 milliGRAM(s) Oral two times a day  vancomycin  IVPB 1000 milliGRAM(s) IV Intermittent every 12 hours      SOCIAL HISTORY:  Smoker:  YES, some day smoker about 1/2 pack  ETOH use:  Socially    VITALS:  Vital Signs Last 24 Hrs  T(C): 36.9 (21 Sep 2017 05:13), Max: 36.9 (21 Sep 2017 05:13)  T(F): 98.4 (21 Sep 2017 05:13), Max: 98.4 (21 Sep 2017 05:13)  HR: 88 (21 Sep 2017 05:13) (74 - 88)  BP: 111/63 (21 Sep 2017 05:13) (109/74 - 111/63)  BP(mean): --  RR: 16 (21 Sep 2017 05:13) (16 - 16)  SpO2: 98% (21 Sep 2017 05:13) (98% - 98%)    LABS/DIAGNOSTIC TESTS:                          16.0   9.8   )-----------( 212      ( 20 Sep 2017 08:20 )             44.6     WBC Count: 9.8 K/uL (09-20 @ 08:20)  WBC Count: 8.3 K/uL (09-18 @ 17:08)      09-20    139  |  103  |  9   ----------------------------<  77  3.8   |  31  |  0.99    Ca    9.2      20 Sep 2017 08:20      Radiology:  < from: Xray Ankle Complete 3 Views, Left (09.18.17 @ 17:00) >    EXAM:  ANKLE LEFT (MINIMUM 3 V)                            PROCEDURE DATE:  09/18/2017          INTERPRETATION:  Left ankle. Patient has pain laterally.    Patient had a distal fibular fracture demonstrated on July 23 and had   subsequent fixation.    3 views obtained.    Extensive hardware transfixes the fibular fracture and there are signs of   healing and good alignment.    There is also horizontal pinning device going from the fibula across the   tibia.    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    Small fracture fragment off the medial malleolus could be from an old   event.    IMPRESSION: Signs of healing.                FLORENTIN KANG M.D., ATTENDING RADIOLOGIST  This document has been electronically signed. Sep 19 2017  9:49AM                < end of copied text > HPI:  23 y/o M s/p ORIF left ankle 7/23/17 presented with left ankle drainage from lateral incision x 1 week. Pt underwent I &D of left ankle and removal of hardware on 9/19/17. No Fevers and no leukocytosis but does have pain in his left ankle. He had pus over the hardware hence was removed. Pt is growing out staph aureus from wound cultures, sensitivities are pending.         PAST MEDICAL & SURGICAL HISTORY:  No pertinent past medical history  No significant past surgical history      No Known Allergies      Meds:  enoxaparin Injectable 40 milliGRAM(s) SubCutaneous every 24 hours  acetaminophen   Tablet 650 milliGRAM(s) Oral every 6 hours PRN  oxyCODONE    IR 10 milliGRAM(s) Oral every 6 hours PRN  oxyCODONE    IR 5 milliGRAM(s) Oral every 4 hours PRN  ondansetron Injectable 4 milliGRAM(s) IV Push every 6 hours PRN  docusate sodium 100 milliGRAM(s) Oral three times a day  ferrous    sulfate 325 milliGRAM(s) Oral three times a day with meals  folic acid 1 milliGRAM(s) Oral daily  multivitamin 1 Tablet(s) Oral daily  ascorbic acid 500 milliGRAM(s) Oral two times a day  vancomycin  IVPB 1000 milliGRAM(s) IV Intermittent every 12 hours      SOCIAL HISTORY:  Smoker:  YES, some day smoker about 1/2 pack  ETOH use:  Socially    VITALS:  Vital Signs Last 24 Hrs  T(C): 36.9 (21 Sep 2017 05:13), Max: 36.9 (21 Sep 2017 05:13)  T(F): 98.4 (21 Sep 2017 05:13), Max: 98.4 (21 Sep 2017 05:13)  HR: 88 (21 Sep 2017 05:13) (74 - 88)  BP: 111/63 (21 Sep 2017 05:13) (109/74 - 111/63)  BP(mean): --  RR: 16 (21 Sep 2017 05:13) (16 - 16)  SpO2: 98% (21 Sep 2017 05:13) (98% - 98%)    LABS/DIAGNOSTIC TESTS:                          16.0   9.8   )-----------( 212      ( 20 Sep 2017 08:20 )             44.6     WBC Count: 9.8 K/uL (09-20 @ 08:20)  WBC Count: 8.3 K/uL (09-18 @ 17:08)      09-20    139  |  103  |  9   ----------------------------<  77  3.8   |  31  |  0.99    Ca    9.2      20 Sep 2017 08:20      Radiology:  < from: Xray Ankle Complete 3 Views, Left (09.18.17 @ 17:00) >    EXAM:  ANKLE LEFT (MINIMUM 3 V)                            PROCEDURE DATE:  09/18/2017          INTERPRETATION:  Left ankle. Patient has pain laterally.    Patient had a distal fibular fracture demonstrated on July 23 and had   subsequent fixation.    3 views obtained.    Extensive hardware transfixes the fibular fracture and there are signs of   healing and good alignment.    There is also horizontal pinning device going from the fibula across the   tibia.    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    Small fracture fragment off the medial malleolus could be from an old   event.    IMPRESSION: Signs of healing.                FLORENTIN KANG M.D., ATTENDING RADIOLOGIST  This document has been electronically signed. Sep 19 2017  9:49AM                < end of copied text >

## 2017-09-21 NOTE — PROGRESS NOTE ADULT - SUBJECTIVE AND OBJECTIVE BOX
Orthopedics    Dx: S/p I&D L ankle with removal of hardware POD#2    Pt seen and evaluated at bedside with no acute complaints.  Wound vac intact. pain 2/10 of L ankle now.  Pt denies Chest pain, SOB, dyspnea, paresthesias, N/V/D, abdominal pain, syncope, or pain anywhere else.     ICU Vital Signs Last 24 Hrs  T(C): 36.9 (21 Sep 2017 05:13), Max: 36.9 (21 Sep 2017 05:13)  T(F): 98.4 (21 Sep 2017 05:13), Max: 98.4 (21 Sep 2017 05:13)  HR: 88 (21 Sep 2017 05:13) (74 - 104)  BP: 111/63 (21 Sep 2017 05:13) (109/74 - 127/76)  BP(mean): --  ABP: --  ABP(mean): --  RR: 16 (21 Sep 2017 05:13) (16 - 16)  SpO2: 98% (21 Sep 2017 05:13) (97% - 98%)    PE:  general: AAOx3 NAD. Stable  L Ankle: Wound vac drain intact with no leakage. ACE bandage intact with no drainage. Dressing c/d. Skin pink warm. Medial mall wound with stitches intact, no drainage. healing well. Lateral mall wound with wound vac over it. SILT. Good cap refill. 2+ pulses. NVI    Impression:  23 y/o M S/p I&D L ankle with removal of hardware POD#2  Plan:  - Pain control  - Dvt ppx  - c/w Vanco IV abx as per ID  - Dr Brower on the case  - Wound vac change Friday (qMWF)  - Keep LLE Elevated on 2 pillows: Orthopedics    Dx: S/p I&D L ankle with removal of hardware POD#2    Pt seen and evaluated at bedside with no acute complaints.  Wound vac intact. pain 2/10 of L ankle now.  Pt denies Chest pain, SOB, dyspnea, paresthesias, N/V/D, abdominal pain, syncope, or pain anywhere else.     ICU Vital Signs Last 24 Hrs  T(C): 36.9 (21 Sep 2017 05:13), Max: 36.9 (21 Sep 2017 05:13)  T(F): 98.4 (21 Sep 2017 05:13), Max: 98.4 (21 Sep 2017 05:13)  HR: 88 (21 Sep 2017 05:13) (74 - 104)  BP: 111/63 (21 Sep 2017 05:13) (109/74 - 127/76)  BP(mean): --  ABP: --  ABP(mean): --  RR: 16 (21 Sep 2017 05:13) (16 - 16)  SpO2: 98% (21 Sep 2017 05:13) (97% - 98%)    PE:  general: AAOx3 NAD. Stable  L Ankle: Wound vac drain intact with no leakage. ACE bandage intact with no drainage. Dressing c/d. Skin pink warm. Medial mall wound with stitches intact, no drainage. healing well. Lateral mall wound with wound vac over it. SILT. Good cap refill. 2+ pulses. NVI    Wound Cx(intra-op): NGTD   Blood cx: No growth    Impression:  21 y/o M S/p I&D L ankle with removal of hardware POD#2  Plan:  - Pain control  - Dvt ppx  - c/w Vanco IV abx as per ID  - Dr Brower on the case  - Wound vac change Friday (qMWF)  - Keep LLE Elevated on 2 pillows:

## 2017-09-21 NOTE — CONSULT NOTE ADULT - NEUROLOGICAL DETAILS
responds to pain/sensation intact/responds to verbal commands/deep reflexes intact/alert and oriented x 3 normal strength/alert and oriented x 3

## 2017-09-21 NOTE — CONSULT NOTE ADULT - RS GEN PE MLT RESP DETAILS PC
no rhonchi/good air movement/respirations non-labored/airway patent/breath sounds equal/no rales no rhonchi/clear to auscultation bilaterally/no rales/no wheezes/good air movement

## 2017-09-22 LAB
-  AMPICILLIN/SULBACTAM: SIGNIFICANT CHANGE UP
-  AMPICILLIN/SULBACTAM: SIGNIFICANT CHANGE UP
-  CEFAZOLIN: SIGNIFICANT CHANGE UP
-  CEFAZOLIN: SIGNIFICANT CHANGE UP
-  CIPROFLOXACIN: SIGNIFICANT CHANGE UP
-  CIPROFLOXACIN: SIGNIFICANT CHANGE UP
-  CLINDAMYCIN: SIGNIFICANT CHANGE UP
-  CLINDAMYCIN: SIGNIFICANT CHANGE UP
-  ERYTHROMYCIN: SIGNIFICANT CHANGE UP
-  ERYTHROMYCIN: SIGNIFICANT CHANGE UP
-  GENTAMICIN: SIGNIFICANT CHANGE UP
-  GENTAMICIN: SIGNIFICANT CHANGE UP
-  LEVOFLOXACIN: SIGNIFICANT CHANGE UP
-  LEVOFLOXACIN: SIGNIFICANT CHANGE UP
-  MOXIFLOXACIN(AEROBIC): SIGNIFICANT CHANGE UP
-  MOXIFLOXACIN(AEROBIC): SIGNIFICANT CHANGE UP
-  OXACILLIN: SIGNIFICANT CHANGE UP
-  OXACILLIN: SIGNIFICANT CHANGE UP
-  RIFAMPIN: SIGNIFICANT CHANGE UP
-  RIFAMPIN: SIGNIFICANT CHANGE UP
-  TETRACYCLINE: SIGNIFICANT CHANGE UP
-  TETRACYCLINE: SIGNIFICANT CHANGE UP
-  TRIMETHOPRIM/SULFAMETHOXAZOLE: SIGNIFICANT CHANGE UP
-  TRIMETHOPRIM/SULFAMETHOXAZOLE: SIGNIFICANT CHANGE UP
-  VANCOMYCIN: SIGNIFICANT CHANGE UP
-  VANCOMYCIN: SIGNIFICANT CHANGE UP
METHOD TYPE: SIGNIFICANT CHANGE UP
METHOD TYPE: SIGNIFICANT CHANGE UP

## 2017-09-22 PROCEDURE — 36569 INSJ PICC 5 YR+ W/O IMAGING: CPT

## 2017-09-22 PROCEDURE — 76937 US GUIDE VASCULAR ACCESS: CPT | Mod: 26

## 2017-09-22 PROCEDURE — 77001 FLUOROGUIDE FOR VEIN DEVICE: CPT | Mod: 26

## 2017-09-22 RX ORDER — CEFTRIAXONE 500 MG/1
2 INJECTION, POWDER, FOR SOLUTION INTRAMUSCULAR; INTRAVENOUS ONCE
Qty: 0 | Refills: 0 | Status: COMPLETED | OUTPATIENT
Start: 2017-09-22 | End: 2017-09-22

## 2017-09-22 RX ORDER — ASPIRIN/CALCIUM CARB/MAGNESIUM 324 MG
1 TABLET ORAL
Qty: 30 | Refills: 0 | OUTPATIENT
Start: 2017-09-22 | End: 2017-10-22

## 2017-09-22 RX ORDER — SODIUM CHLORIDE 9 MG/ML
20 INJECTION INTRAMUSCULAR; INTRAVENOUS; SUBCUTANEOUS ONCE
Qty: 0 | Refills: 0 | Status: DISCONTINUED | OUTPATIENT
Start: 2017-09-22 | End: 2017-09-24

## 2017-09-22 RX ORDER — SODIUM CHLORIDE 9 MG/ML
10 INJECTION INTRAMUSCULAR; INTRAVENOUS; SUBCUTANEOUS EVERY 12 HOURS
Qty: 0 | Refills: 0 | Status: DISCONTINUED | OUTPATIENT
Start: 2017-09-22 | End: 2017-09-24

## 2017-09-22 RX ORDER — KETOROLAC TROMETHAMINE 30 MG/ML
30 SYRINGE (ML) INJECTION ONCE
Qty: 0 | Refills: 0 | Status: DISCONTINUED | OUTPATIENT
Start: 2017-09-22 | End: 2017-09-22

## 2017-09-22 RX ORDER — SODIUM CHLORIDE 9 MG/ML
10 INJECTION INTRAMUSCULAR; INTRAVENOUS; SUBCUTANEOUS
Qty: 0 | Refills: 0 | Status: DISCONTINUED | OUTPATIENT
Start: 2017-09-22 | End: 2017-09-24

## 2017-09-22 RX ORDER — CEFTRIAXONE 500 MG/1
1 INJECTION, POWDER, FOR SOLUTION INTRAMUSCULAR; INTRAVENOUS
Qty: 42 | Refills: 0 | OUTPATIENT
Start: 2017-09-22 | End: 2017-11-03

## 2017-09-22 RX ORDER — CEFTRIAXONE 500 MG/1
2 INJECTION, POWDER, FOR SOLUTION INTRAMUSCULAR; INTRAVENOUS EVERY 24 HOURS
Qty: 0 | Refills: 0 | Status: DISCONTINUED | OUTPATIENT
Start: 2017-09-23 | End: 2017-09-24

## 2017-09-22 RX ORDER — CEFTRIAXONE 500 MG/1
INJECTION, POWDER, FOR SOLUTION INTRAMUSCULAR; INTRAVENOUS
Qty: 0 | Refills: 0 | Status: DISCONTINUED | OUTPATIENT
Start: 2017-09-22 | End: 2017-09-24

## 2017-09-22 RX ORDER — KETOROLAC TROMETHAMINE 30 MG/ML
15 SYRINGE (ML) INJECTION ONCE
Qty: 0 | Refills: 0 | Status: DISCONTINUED | OUTPATIENT
Start: 2017-09-22 | End: 2017-09-22

## 2017-09-22 RX ADMIN — OXYCODONE HYDROCHLORIDE 10 MILLIGRAM(S): 5 TABLET ORAL at 21:04

## 2017-09-22 RX ADMIN — OXYCODONE HYDROCHLORIDE 10 MILLIGRAM(S): 5 TABLET ORAL at 05:15

## 2017-09-22 RX ADMIN — Medication 1 TABLET(S): at 12:44

## 2017-09-22 RX ADMIN — Medication 500 MILLIGRAM(S): at 18:00

## 2017-09-22 RX ADMIN — OXYCODONE HYDROCHLORIDE 10 MILLIGRAM(S): 5 TABLET ORAL at 16:33

## 2017-09-22 RX ADMIN — Medication 30 MILLIGRAM(S): at 23:02

## 2017-09-22 RX ADMIN — Medication 100 MILLIGRAM(S): at 21:05

## 2017-09-22 RX ADMIN — ENOXAPARIN SODIUM 40 MILLIGRAM(S): 100 INJECTION SUBCUTANEOUS at 12:37

## 2017-09-22 RX ADMIN — OXYCODONE HYDROCHLORIDE 10 MILLIGRAM(S): 5 TABLET ORAL at 03:40

## 2017-09-22 RX ADMIN — Medication 15 MILLIGRAM(S): at 08:39

## 2017-09-22 RX ADMIN — OXYCODONE HYDROCHLORIDE 10 MILLIGRAM(S): 5 TABLET ORAL at 09:27

## 2017-09-22 RX ADMIN — Medication 15 MILLIGRAM(S): at 09:30

## 2017-09-22 RX ADMIN — Medication 250 MILLIGRAM(S): at 05:30

## 2017-09-22 RX ADMIN — Medication 1 MILLIGRAM(S): at 12:44

## 2017-09-22 RX ADMIN — Medication 100 MILLIGRAM(S): at 15:17

## 2017-09-22 RX ADMIN — CEFTRIAXONE 100 GRAM(S): 500 INJECTION, POWDER, FOR SOLUTION INTRAMUSCULAR; INTRAVENOUS at 18:01

## 2017-09-22 RX ADMIN — Medication 325 MILLIGRAM(S): at 12:43

## 2017-09-22 RX ADMIN — Medication 100 MILLIGRAM(S): at 05:31

## 2017-09-22 RX ADMIN — Medication 325 MILLIGRAM(S): at 18:00

## 2017-09-22 RX ADMIN — OXYCODONE HYDROCHLORIDE 10 MILLIGRAM(S): 5 TABLET ORAL at 10:00

## 2017-09-22 RX ADMIN — OXYCODONE HYDROCHLORIDE 10 MILLIGRAM(S): 5 TABLET ORAL at 15:17

## 2017-09-22 RX ADMIN — Medication 30 MILLIGRAM(S): at 23:20

## 2017-09-22 RX ADMIN — Medication 500 MILLIGRAM(S): at 05:31

## 2017-09-22 RX ADMIN — OXYCODONE HYDROCHLORIDE 10 MILLIGRAM(S): 5 TABLET ORAL at 22:13

## 2017-09-22 NOTE — DISCHARGE NOTE ADULT - HOSPITAL COURSE
Patient admitted for I&D of Left Ankle with Removal of Hardware. Wound was cultured and patient was administered IV Antibiotics. Patient had also received PICC line for long term use of antibiotics.

## 2017-09-22 NOTE — PROGRESS NOTE ADULT - SUBJECTIVE AND OBJECTIVE BOX
Ortho Note POD# 3  22yMale    Diagnosis:  S/p I&D L Ankle POD# 3    Patient is seen and evaluated at bedside; offers no acute complaints. Pain is mild; well controlled.  Has been OOB with PT. Voiding regularly     Vital Signs Last 24 Hrs  T(C): 36.7 (22 Sep 2017 06:12), Max: 36.7 (22 Sep 2017 06:12)  T(F): 98.1 (22 Sep 2017 06:12), Max: 98.1 (22 Sep 2017 06:12)  HR: 77 (22 Sep 2017 06:12) (56 - 77)  BP: 120/72 (22 Sep 2017 06:12) (111/69 - 121/77)  BP(mean): --  RR: 16 (22 Sep 2017 06:12) (16 - 16)  SpO2: 100% (22 Sep 2017 06:12) (98% - 100%)    Physical Exam:    General: AAOx3, in NAD, resting in bed.    Left Ankle:   Med and Lat. Wound/sutures C/D/I; healing well.  Calves are soft, non-tender. 2+pulses. NVI. Wound vac in place laterally         No new Labs      Impression:  22yMale S/p I&D of L Ankle + Wound Vac POD# 3  Plan:  -  Continue pain management  -  DVT prophylaxis with Lovenox  -  Daily Physical Therapy:  WBAT on LLE with crutches  -  Discharge planning with PICC line  -  Dressing/wound vac changed  -  Encouraged use of incentive spirometer  -  Case d/w

## 2017-09-22 NOTE — DISCHARGE NOTE ADULT - MEDICATION SUMMARY - MEDICATIONS TO STOP TAKING
I will STOP taking the medications listed below when I get home from the hospital:    cephalexin 500 mg oral capsule  -- 1 cap(s) by mouth 4 times a day  -- Finish all this medication unless otherwise directed by prescriber.

## 2017-09-22 NOTE — DISCHARGE NOTE ADULT - PLAN OF CARE
I&D of Left Ankle with Removal of Hardware 1. pain management  2. Keep wound vac clean and dry, do not wet. Change as directed.  3. Non weight bearing of left ankle with crutches  4. Continue IV Antibiotics via PICC line  5. Follow up with Dr. Bone within 1 week for wound check and possible suture removal 1. pain management  2. Keep wound vac clean and dry, do not wet. Change as directed.  3. Non weight bearing of left ankle with crutches  4. Continue IV Antibiotics(Rocephin 1g once a day via PICC line for 6 weeks)  5. Follow up with Dr. Bone within 1 week for wound check and possible suture removal  6. Take Aspirin 325mg once a day for 1 month

## 2017-09-22 NOTE — DISCHARGE NOTE ADULT - CARE PLAN
Principal Discharge DX:	Wound dehiscence, surgical, subsequent encounter  Goal:	I&D of Left Ankle with Removal of Hardware  Instructions for follow-up, activity and diet:	1. pain management  2. Keep wound vac clean and dry, do not wet. Change as directed.  3. Non weight bearing of left ankle with crutches  4. Continue IV Antibiotics via PICC line  5. Follow up with Dr. Bone within 1 week for wound check and possible suture removal Principal Discharge DX:	Wound dehiscence, surgical, subsequent encounter  Goal:	I&D of Left Ankle with Removal of Hardware  Instructions for follow-up, activity and diet:	1. pain management  2. Keep wound vac clean and dry, do not wet. Change as directed.  3. Non weight bearing of left ankle with crutches  4. Continue IV Antibiotics(Rocephin 1g once a day via PICC line for 6 weeks)  5. Follow up with Dr. Bone within 1 week for wound check and possible suture removal  6. Take Aspirin 325mg once a day for 1 month

## 2017-09-22 NOTE — DISCHARGE NOTE ADULT - MEDICATION SUMMARY - MEDICATIONS TO TAKE
I will START or STAY ON the medications listed below when I get home from the hospital:    Percocet 5/325 oral tablet  -- 1 tab(s) by mouth every 4 hours MDD:6 tabs  -- Caution federal law prohibits the transfer of this drug to any person other  than the person for whom it was prescribed.  May cause drowsiness.  Alcohol may intensify this effect.  Use care when operating dangerous machinery.  This prescription cannot be refilled.  This product contains acetaminophen.  Do not use  with any other product containing acetaminophen to prevent possible liver damage.  Using more of this medication than prescribed may cause serious breathing problems.    -- Indication: For pain    aspirin 325 mg oral tablet  -- 1 tab(s) by mouth once a day   -- Take with food or milk.    -- Indication: For Dvt prophylaxis    cefTRIAXone 1 g injection  -- 1 gram(s) injectable once a day MDD:1g  -- Indication: For left ankle infection

## 2017-09-22 NOTE — DISCHARGE NOTE ADULT - CARE PROVIDER_API CALL
Randy Bone), Orthopaedic Surgery  [address] [place] [address]  Phone: [phone]  Fax: [phone]

## 2017-09-22 NOTE — DISCHARGE NOTE ADULT - PATIENT PORTAL LINK FT
“You can access the Follow[employer] Patient Portal, offered by [facility], by registering with the following website: [email or web address]”

## 2017-09-23 RX ADMIN — OXYCODONE HYDROCHLORIDE 10 MILLIGRAM(S): 5 TABLET ORAL at 16:05

## 2017-09-23 RX ADMIN — Medication 325 MILLIGRAM(S): at 11:52

## 2017-09-23 RX ADMIN — Medication 500 MILLIGRAM(S): at 17:10

## 2017-09-23 RX ADMIN — OXYCODONE HYDROCHLORIDE 10 MILLIGRAM(S): 5 TABLET ORAL at 03:29

## 2017-09-23 RX ADMIN — CEFTRIAXONE 100 GRAM(S): 500 INJECTION, POWDER, FOR SOLUTION INTRAMUSCULAR; INTRAVENOUS at 13:39

## 2017-09-23 RX ADMIN — OXYCODONE HYDROCHLORIDE 10 MILLIGRAM(S): 5 TABLET ORAL at 17:11

## 2017-09-23 RX ADMIN — ENOXAPARIN SODIUM 40 MILLIGRAM(S): 100 INJECTION SUBCUTANEOUS at 11:52

## 2017-09-23 RX ADMIN — Medication 1 MILLIGRAM(S): at 11:52

## 2017-09-23 RX ADMIN — Medication 100 MILLIGRAM(S): at 11:52

## 2017-09-23 RX ADMIN — OXYCODONE HYDROCHLORIDE 10 MILLIGRAM(S): 5 TABLET ORAL at 23:30

## 2017-09-23 RX ADMIN — OXYCODONE HYDROCHLORIDE 10 MILLIGRAM(S): 5 TABLET ORAL at 09:39

## 2017-09-23 RX ADMIN — OXYCODONE HYDROCHLORIDE 10 MILLIGRAM(S): 5 TABLET ORAL at 22:29

## 2017-09-23 RX ADMIN — Medication 1 TABLET(S): at 11:52

## 2017-09-23 RX ADMIN — SODIUM CHLORIDE 10 MILLILITER(S): 9 INJECTION INTRAMUSCULAR; INTRAVENOUS; SUBCUTANEOUS at 06:00

## 2017-09-23 RX ADMIN — Medication 100 MILLIGRAM(S): at 05:50

## 2017-09-23 RX ADMIN — Medication 500 MILLIGRAM(S): at 05:50

## 2017-09-23 RX ADMIN — Medication 100 MILLIGRAM(S): at 22:30

## 2017-09-23 RX ADMIN — Medication 325 MILLIGRAM(S): at 17:10

## 2017-09-23 RX ADMIN — OXYCODONE HYDROCHLORIDE 10 MILLIGRAM(S): 5 TABLET ORAL at 10:23

## 2017-09-23 RX ADMIN — OXYCODONE HYDROCHLORIDE 10 MILLIGRAM(S): 5 TABLET ORAL at 04:30

## 2017-09-23 NOTE — PROGRESS NOTE ADULT - SUBJECTIVE AND OBJECTIVE BOX
ALEISHA ASKEW  [id number]    Orthopedics:    Diagnosis:  S/p I&D and Removal of Hardware Left Ankle POD#4    Pt seen and evaluated. No acute complaints.  As per pt, Splint feels comfortable.  Denies CP/SOB, dyspnea, paresthesias    Vital Signs Last 24 Hrs  T(C): 36.6 (23 Sep 2017 05:39), Max: 36.7 (22 Sep 2017 08:30)  T(F): 97.8 (23 Sep 2017 05:39), Max: 98 (22 Sep 2017 08:30)  HR: 80 (23 Sep 2017 05:39) (59 - 80)  BP: 119/63 (23 Sep 2017 05:39) (109/52 - 123/82)  BP(mean): --  RR: 16 (23 Sep 2017 05:39) (15 - 16)  SpO2: 97% (23 Sep 2017 05:39) (97% - 100%)    Physical Exam:  Left Ankle- Toes are pink, warm. with SILT. No pain with PROM of toes. Good cap refill.  AO Splint intact. Calves are soft and NT. Wound vac in place at lateral incision site.     Labs:       Impression: 22yMale S/p I&D and Removal of Hardware Left Ankle POD#4  -  Pain management  -  DVT prophylaxis with Lovenox  -  Daily PT- NWB of the Left Ankle  -  C/w IV Antibiotics Rocephin as per ID  -  Awaiting authorization for infusion services and VNS for wound vac  -  Wound vac change MWF, last change yesterday 9/22/17

## 2017-09-23 NOTE — PROVIDER CONTACT NOTE (CRITICAL VALUE NOTIFICATION) - TEST AND RESULT REPORTED:
Test correction on Tissue culture collected few staphylococcus aureus, preliminary was previously reported as few coag negative staphylococcus

## 2017-09-24 VITALS
HEART RATE: 63 BPM | TEMPERATURE: 97 F | SYSTOLIC BLOOD PRESSURE: 105 MMHG | RESPIRATION RATE: 16 BRPM | OXYGEN SATURATION: 99 % | DIASTOLIC BLOOD PRESSURE: 55 MMHG

## 2017-09-24 LAB
CULTURE RESULTS: SIGNIFICANT CHANGE UP
CULTURE RESULTS: SIGNIFICANT CHANGE UP
SPECIMEN SOURCE: SIGNIFICANT CHANGE UP
SPECIMEN SOURCE: SIGNIFICANT CHANGE UP

## 2017-09-24 PROCEDURE — 73610 X-RAY EXAM OF ANKLE: CPT

## 2017-09-24 PROCEDURE — 80053 COMPREHEN METABOLIC PANEL: CPT

## 2017-09-24 PROCEDURE — C1751: CPT

## 2017-09-24 PROCEDURE — 88300 SURGICAL PATH GROSS: CPT

## 2017-09-24 PROCEDURE — 87075 CULTR BACTERIA EXCEPT BLOOD: CPT

## 2017-09-24 PROCEDURE — 86901 BLOOD TYPING SEROLOGIC RH(D): CPT

## 2017-09-24 PROCEDURE — 93005 ELECTROCARDIOGRAM TRACING: CPT

## 2017-09-24 PROCEDURE — 86850 RBC ANTIBODY SCREEN: CPT

## 2017-09-24 PROCEDURE — 85730 THROMBOPLASTIN TIME PARTIAL: CPT

## 2017-09-24 PROCEDURE — 86900 BLOOD TYPING SEROLOGIC ABO: CPT

## 2017-09-24 PROCEDURE — 36569 INSJ PICC 5 YR+ W/O IMAGING: CPT

## 2017-09-24 PROCEDURE — 85610 PROTHROMBIN TIME: CPT

## 2017-09-24 PROCEDURE — 87186 SC STD MICRODIL/AGAR DIL: CPT

## 2017-09-24 PROCEDURE — 76000 FLUOROSCOPY <1 HR PHYS/QHP: CPT

## 2017-09-24 PROCEDURE — 86140 C-REACTIVE PROTEIN: CPT

## 2017-09-24 PROCEDURE — 87070 CULTURE OTHR SPECIMN AEROBIC: CPT

## 2017-09-24 PROCEDURE — 80202 ASSAY OF VANCOMYCIN: CPT

## 2017-09-24 PROCEDURE — 97161 PT EVAL LOW COMPLEX 20 MIN: CPT

## 2017-09-24 PROCEDURE — 77001 FLUOROGUIDE FOR VEIN DEVICE: CPT

## 2017-09-24 PROCEDURE — 99285 EMERGENCY DEPT VISIT HI MDM: CPT | Mod: 25

## 2017-09-24 PROCEDURE — 85027 COMPLETE CBC AUTOMATED: CPT

## 2017-09-24 PROCEDURE — 76937 US GUIDE VASCULAR ACCESS: CPT

## 2017-09-24 PROCEDURE — 87040 BLOOD CULTURE FOR BACTERIA: CPT

## 2017-09-24 PROCEDURE — 80048 BASIC METABOLIC PNL TOTAL CA: CPT

## 2017-09-24 RX ADMIN — OXYCODONE HYDROCHLORIDE 10 MILLIGRAM(S): 5 TABLET ORAL at 05:44

## 2017-09-24 RX ADMIN — OXYCODONE HYDROCHLORIDE 10 MILLIGRAM(S): 5 TABLET ORAL at 18:21

## 2017-09-24 RX ADMIN — Medication 325 MILLIGRAM(S): at 11:37

## 2017-09-24 RX ADMIN — OXYCODONE HYDROCHLORIDE 10 MILLIGRAM(S): 5 TABLET ORAL at 11:37

## 2017-09-24 RX ADMIN — ENOXAPARIN SODIUM 40 MILLIGRAM(S): 100 INJECTION SUBCUTANEOUS at 11:37

## 2017-09-24 RX ADMIN — OXYCODONE HYDROCHLORIDE 10 MILLIGRAM(S): 5 TABLET ORAL at 17:29

## 2017-09-24 RX ADMIN — CEFTRIAXONE 100 GRAM(S): 500 INJECTION, POWDER, FOR SOLUTION INTRAMUSCULAR; INTRAVENOUS at 13:39

## 2017-09-24 RX ADMIN — Medication 100 MILLIGRAM(S): at 13:41

## 2017-09-24 RX ADMIN — Medication 325 MILLIGRAM(S): at 07:53

## 2017-09-24 RX ADMIN — Medication 500 MILLIGRAM(S): at 05:45

## 2017-09-24 RX ADMIN — Medication 500 MILLIGRAM(S): at 17:29

## 2017-09-24 RX ADMIN — Medication 1 MILLIGRAM(S): at 11:37

## 2017-09-24 RX ADMIN — Medication 325 MILLIGRAM(S): at 17:29

## 2017-09-24 RX ADMIN — OXYCODONE HYDROCHLORIDE 10 MILLIGRAM(S): 5 TABLET ORAL at 12:37

## 2017-09-24 RX ADMIN — Medication 100 MILLIGRAM(S): at 05:45

## 2017-09-24 RX ADMIN — Medication 1 TABLET(S): at 11:37

## 2017-09-24 RX ADMIN — OXYCODONE HYDROCHLORIDE 10 MILLIGRAM(S): 5 TABLET ORAL at 04:30

## 2017-09-24 NOTE — PROGRESS NOTE ADULT - SUBJECTIVE AND OBJECTIVE BOX
ALEISHA ASKEW  [id number]    Orthopedics:    Diagnosis:  S/p I&D and Removal of Hardware of Left Ankle POD#5    Pt seen and evaluated. No acute complaints.  As per pt, Splint feels comfortable.  Denies CP/SOB, dyspnea, paresthesias    Vital Signs Last 24 Hrs  T(C): 36.4 (24 Sep 2017 05:23), Max: 36.8 (23 Sep 2017 14:35)  T(F): 97.5 (24 Sep 2017 05:23), Max: 98.2 (23 Sep 2017 14:35)  HR: 62 (24 Sep 2017 05:23) (62 - 80)  BP: 124/77 (24 Sep 2017 05:23) (124/66 - 127/76)  BP(mean): --  RR: 16 (24 Sep 2017 05:23) (16 - 16)  SpO2: 97% (24 Sep 2017 05:23) (97% - 100%)    Physical Exam:  Left Ankle- Toes are pink, warm. with SILT. No pain with PROM of toes. Good cap refill.  AO  Splint intact. Calves are soft and NT. Wound vac in place on lateral aspect of incision. Ace bandage intact.      Impression: 22yMale  S/p I&D and Removal of Hardware of Left Ankle POD#5  -  Pain management  -  DVT prophylaxis with Lovenox  -  Daily PT- NWB of the  Left Ankle  -  C/w Antibiotic Rocephin as per ID via PICC line  -  D/c home possibly tomorrow  -  Wound vac to be changed tomorrow  -  Awaiting authorization for VNS and infusion services

## 2017-09-25 ENCOUNTER — INPATIENT (INPATIENT)
Facility: HOSPITAL | Age: 22
LOS: 0 days | Discharge: HOME | DRG: 556 | End: 2017-09-25
Attending: ORTHOPAEDIC SURGERY | Admitting: ORTHOPAEDIC SURGERY
Payer: MEDICAID

## 2017-09-25 VITALS
SYSTOLIC BLOOD PRESSURE: 99 MMHG | OXYGEN SATURATION: 97 % | HEART RATE: 58 BPM | DIASTOLIC BLOOD PRESSURE: 57 MMHG | TEMPERATURE: 98 F | RESPIRATION RATE: 16 BRPM

## 2017-09-25 VITALS
SYSTOLIC BLOOD PRESSURE: 140 MMHG | HEIGHT: 72 IN | WEIGHT: 154.98 LBS | OXYGEN SATURATION: 100 % | TEMPERATURE: 98 F | DIASTOLIC BLOOD PRESSURE: 86 MMHG | HEART RATE: 129 BPM | RESPIRATION RATE: 20 BRPM

## 2017-09-25 DIAGNOSIS — S82.892A OTHER FRACTURE OF LEFT LOWER LEG, INITIAL ENCOUNTER FOR CLOSED FRACTURE: Chronic | ICD-10-CM

## 2017-09-25 DIAGNOSIS — M25.572 PAIN IN LEFT ANKLE AND JOINTS OF LEFT FOOT: ICD-10-CM

## 2017-09-25 LAB
ANION GAP SERPL CALC-SCNC: 7 MMOL/L — SIGNIFICANT CHANGE UP (ref 5–17)
BASOPHILS # BLD AUTO: 0.1 K/UL — SIGNIFICANT CHANGE UP (ref 0–0.2)
BASOPHILS NFR BLD AUTO: 0.9 % — SIGNIFICANT CHANGE UP (ref 0–2)
BUN SERPL-MCNC: 17 MG/DL — SIGNIFICANT CHANGE UP (ref 7–18)
CALCIUM SERPL-MCNC: 9.2 MG/DL — SIGNIFICANT CHANGE UP (ref 8.4–10.5)
CHLORIDE SERPL-SCNC: 105 MMOL/L — SIGNIFICANT CHANGE UP (ref 96–108)
CO2 SERPL-SCNC: 28 MMOL/L — SIGNIFICANT CHANGE UP (ref 22–31)
CREAT SERPL-MCNC: 1.07 MG/DL — SIGNIFICANT CHANGE UP (ref 0.5–1.3)
CULTURE RESULTS: SIGNIFICANT CHANGE UP
EOSINOPHIL # BLD AUTO: 0.1 K/UL — SIGNIFICANT CHANGE UP (ref 0–0.5)
EOSINOPHIL NFR BLD AUTO: 2 % — SIGNIFICANT CHANGE UP (ref 0–6)
GLUCOSE SERPL-MCNC: 109 MG/DL — HIGH (ref 70–99)
HCT VFR BLD CALC: 43.4 % — SIGNIFICANT CHANGE UP (ref 39–50)
HGB BLD-MCNC: 14.8 G/DL — SIGNIFICANT CHANGE UP (ref 13–17)
INR BLD: 1.08 RATIO — SIGNIFICANT CHANGE UP (ref 0.88–1.16)
LYMPHOCYTES # BLD AUTO: 1.7 K/UL — SIGNIFICANT CHANGE UP (ref 1–3.3)
LYMPHOCYTES # BLD AUTO: 23.1 % — SIGNIFICANT CHANGE UP (ref 13–44)
MCHC RBC-ENTMCNC: 31.5 PG — SIGNIFICANT CHANGE UP (ref 27–34)
MCHC RBC-ENTMCNC: 34.1 GM/DL — SIGNIFICANT CHANGE UP (ref 32–36)
MCV RBC AUTO: 92.3 FL — SIGNIFICANT CHANGE UP (ref 80–100)
MONOCYTES # BLD AUTO: 0.5 K/UL — SIGNIFICANT CHANGE UP (ref 0–0.9)
MONOCYTES NFR BLD AUTO: 6.3 % — SIGNIFICANT CHANGE UP (ref 2–14)
NEUTROPHILS # BLD AUTO: 4.9 K/UL — SIGNIFICANT CHANGE UP (ref 1.8–7.4)
NEUTROPHILS NFR BLD AUTO: 67.7 % — SIGNIFICANT CHANGE UP (ref 43–77)
ORGANISM # SPEC MICROSCOPIC CNT: SIGNIFICANT CHANGE UP
ORGANISM # SPEC MICROSCOPIC CNT: SIGNIFICANT CHANGE UP
PLATELET # BLD AUTO: 203 K/UL — SIGNIFICANT CHANGE UP (ref 150–400)
POTASSIUM SERPL-MCNC: 3.9 MMOL/L — SIGNIFICANT CHANGE UP (ref 3.5–5.3)
POTASSIUM SERPL-SCNC: 3.9 MMOL/L — SIGNIFICANT CHANGE UP (ref 3.5–5.3)
PROTHROM AB SERPL-ACNC: 11.8 SEC — SIGNIFICANT CHANGE UP (ref 9.8–12.7)
RBC # BLD: 4.7 M/UL — SIGNIFICANT CHANGE UP (ref 4.2–5.8)
RBC # FLD: 11 % — SIGNIFICANT CHANGE UP (ref 10.3–14.5)
SODIUM SERPL-SCNC: 140 MMOL/L — SIGNIFICANT CHANGE UP (ref 135–145)
SPECIMEN SOURCE: SIGNIFICANT CHANGE UP
WBC # BLD: 7.3 K/UL — SIGNIFICANT CHANGE UP (ref 3.8–10.5)
WBC # FLD AUTO: 7.3 K/UL — SIGNIFICANT CHANGE UP (ref 3.8–10.5)

## 2017-09-25 PROCEDURE — 71045 X-RAY EXAM CHEST 1 VIEW: CPT

## 2017-09-25 PROCEDURE — 93005 ELECTROCARDIOGRAM TRACING: CPT

## 2017-09-25 PROCEDURE — 85027 COMPLETE CBC AUTOMATED: CPT

## 2017-09-25 PROCEDURE — 99285 EMERGENCY DEPT VISIT HI MDM: CPT | Mod: 25

## 2017-09-25 PROCEDURE — 85610 PROTHROMBIN TIME: CPT

## 2017-09-25 PROCEDURE — 93010 ELECTROCARDIOGRAM REPORT: CPT | Mod: 76

## 2017-09-25 PROCEDURE — 71010: CPT | Mod: 26

## 2017-09-25 PROCEDURE — 80048 BASIC METABOLIC PNL TOTAL CA: CPT

## 2017-09-25 PROCEDURE — 71275 CT ANGIOGRAPHY CHEST: CPT | Mod: 26

## 2017-09-25 PROCEDURE — 71275 CT ANGIOGRAPHY CHEST: CPT

## 2017-09-25 RX ORDER — ENOXAPARIN SODIUM 100 MG/ML
40 INJECTION SUBCUTANEOUS DAILY
Qty: 0 | Refills: 0 | Status: DISCONTINUED | OUTPATIENT
Start: 2017-09-25 | End: 2017-09-25

## 2017-09-25 RX ORDER — CEFTRIAXONE 500 MG/1
2 INJECTION, POWDER, FOR SOLUTION INTRAMUSCULAR; INTRAVENOUS
Qty: 42 | Refills: 0 | OUTPATIENT
Start: 2017-09-25 | End: 2017-11-06

## 2017-09-25 RX ORDER — OXYCODONE AND ACETAMINOPHEN 5; 325 MG/1; MG/1
1 TABLET ORAL ONCE
Qty: 0 | Refills: 0 | Status: DISCONTINUED | OUTPATIENT
Start: 2017-09-25 | End: 2017-09-25

## 2017-09-25 RX ORDER — SODIUM CHLORIDE 9 MG/ML
1000 INJECTION INTRAMUSCULAR; INTRAVENOUS; SUBCUTANEOUS ONCE
Qty: 0 | Refills: 0 | Status: COMPLETED | OUTPATIENT
Start: 2017-09-25 | End: 2017-09-25

## 2017-09-25 RX ORDER — CEFTRIAXONE 500 MG/1
2 INJECTION, POWDER, FOR SOLUTION INTRAMUSCULAR; INTRAVENOUS
Qty: 0 | Refills: 0 | COMMUNITY

## 2017-09-25 RX ORDER — OXYCODONE AND ACETAMINOPHEN 5; 325 MG/1; MG/1
1 TABLET ORAL EVERY 4 HOURS
Qty: 0 | Refills: 0 | Status: DISCONTINUED | OUTPATIENT
Start: 2017-09-25 | End: 2017-09-25

## 2017-09-25 RX ORDER — INFLUENZA VIRUS VACCINE 15; 15; 15; 15 UG/.5ML; UG/.5ML; UG/.5ML; UG/.5ML
0.5 SUSPENSION INTRAMUSCULAR ONCE
Qty: 0 | Refills: 0 | Status: DISCONTINUED | OUTPATIENT
Start: 2017-09-25 | End: 2017-09-25

## 2017-09-25 RX ADMIN — OXYCODONE AND ACETAMINOPHEN 1 TABLET(S): 5; 325 TABLET ORAL at 12:34

## 2017-09-25 RX ADMIN — OXYCODONE AND ACETAMINOPHEN 1 TABLET(S): 5; 325 TABLET ORAL at 11:34

## 2017-09-25 RX ADMIN — OXYCODONE AND ACETAMINOPHEN 1 TABLET(S): 5; 325 TABLET ORAL at 02:05

## 2017-09-25 RX ADMIN — SODIUM CHLORIDE 1000 MILLILITER(S): 9 INJECTION INTRAMUSCULAR; INTRAVENOUS; SUBCUTANEOUS at 02:06

## 2017-09-25 RX ADMIN — ENOXAPARIN SODIUM 40 MILLIGRAM(S): 100 INJECTION SUBCUTANEOUS at 16:11

## 2017-09-25 RX ADMIN — OXYCODONE AND ACETAMINOPHEN 1 TABLET(S): 5; 325 TABLET ORAL at 03:49

## 2017-09-25 RX ADMIN — OXYCODONE AND ACETAMINOPHEN 1 TABLET(S): 5; 325 TABLET ORAL at 03:32

## 2017-09-25 NOTE — ED ADULT NURSE NOTE - OBJECTIVE STATEMENT
Pt. is c/o pain in left ankle due to a previous surgery. Pt. has a drainage system put in previously to drain infection. Pt. is c/o pain and feel SOB and a little bit of chest pain. Pt. is c/o pain in left ankle due to a previous surgery. Pt. has a wound vac system put in previously to drain infection. Pt. is c/o pain and feel SOB and a little bit of chest pain.

## 2017-09-25 NOTE — ED PROVIDER NOTE - CONSTITUTIONAL, MLM
normal... Well appearing, slender pt, awake, alert, oriented to person, place, time/situation and in no apparent distress.

## 2017-09-25 NOTE — H&P ADULT - HISTORY OF PRESENT ILLNESS
23y/o M s/p orif left ankle 7/23/17 and I&D + ARSLAN of L ankle with wound vac placement on 9/19/17 signed out AMA due to personal reasons, returned today due to worsening L ankle pain. Patients states he wants to get his Abx and be discharged ASAP. He states he has been walking with cam walker boot and crutches. Denies any fever/chills, N/V/D, paresthesias.

## 2017-09-25 NOTE — ED PROVIDER NOTE - MUSCULOSKELETAL, MLM
Spine appears normal, range of motion is not limited, no muscle or joint tenderness. LLE: drainage in place with ace wrap. No bilateral lower extremity swelling.

## 2017-09-25 NOTE — DISCHARGE NOTE ADULT - MEDICATION SUMMARY - MEDICATIONS TO STOP TAKING
I will STOP taking the medications listed below when I get home from the hospital:  None I will STOP taking the medications listed below when I get home from the hospital:    cefTRIAXone 1 g injection  -- 1 gram(s) injectable once a day MDD:1g

## 2017-09-25 NOTE — DISCHARGE NOTE ADULT - ADDITIONAL INSTRUCTIONS
Pain management  WBAT at LLE  Continue Abx x 42 days  Continue with wound vac until furthur discussed with Dr Bone in his office  Wound vac settings should be on continueos - change vac MWF

## 2017-09-25 NOTE — ED PROVIDER NOTE - OBJECTIVE STATEMENT
23 y/o M pt with no significant PMHx and a significant PSHx of L ankle fracture surgery presents to the ED c/o L ankle pain, SOB and chest pain. Pt states he recently had surgery (08/23/2017) for his L ankle fracture. The hardware got infected, so pt had a washout x5 days ago in the OR with drainage in place. Pt reports he signed out of the hospital because he told hospital staff that he did not want any visitors. Visitors kept on coming, so pt left. Pt is on Oxycontin for pain chronically; last dose taken at 1700. Pt also notes to have SOB and chest pain along with his L ankle pain. Pt currently complains of trouble breathing and severe pain to the L ankle. Pt denies numbness, weakness, tingling or any other complaints. NKDA.

## 2017-09-25 NOTE — DISCHARGE NOTE ADULT - MEDICATION SUMMARY - MEDICATIONS TO TAKE
I will START or STAY ON the medications listed below when I get home from the hospital:    Percocet 5/325 oral tablet  -- 1 tab(s) by mouth every 4 hours MDD:6 tabs  -- Caution federal law prohibits the transfer of this drug to any person other  than the person for whom it was prescribed.  May cause drowsiness.  Alcohol may intensify this effect.  Use care when operating dangerous machinery.  This prescription cannot be refilled.  This product contains acetaminophen.  Do not use  with any other product containing acetaminophen to prevent possible liver damage.  Using more of this medication than prescribed may cause serious breathing problems.    -- Indication: For PAin    cefTRIAXone 1 g injection  -- 1 gram(s) injectable once a day MDD:1g  -- Indication: For Abx I will START or STAY ON the medications listed below when I get home from the hospital:    Percocet 5/325 oral tablet  -- 1 tab(s) by mouth every 4 hours MDD:6 tabs  -- Caution federal law prohibits the transfer of this drug to any person other  than the person for whom it was prescribed.  May cause drowsiness.  Alcohol may intensify this effect.  Use care when operating dangerous machinery.  This prescription cannot be refilled.  This product contains acetaminophen.  Do not use  with any other product containing acetaminophen to prevent possible liver damage.  Using more of this medication than prescribed may cause serious breathing problems.    -- Indication: For PAin    cefTRIAXone 2 g injection  -- 2 gram(s) injectable once a day  -- Indication: For Abx

## 2017-09-25 NOTE — H&P ADULT - ASSESSMENT
21 y/o M S/p L Ankle Wound vac placement  - pain management  -  DVT PPx  -  WBAT at [facility]  - Wound vac changed today  - D/c pt home with PICC once infusion and VNS services set up

## 2017-09-25 NOTE — DISCHARGE NOTE ADULT - CARE PROVIDER_API CALL
Randy Bone), Orthopaedic Surgery  [address] [place] [address]  Phone: [phone]  Fax: [phone]

## 2017-09-25 NOTE — CONSULT NOTE ADULT - SUBJECTIVE AND OBJECTIVE BOX
HPI:  21y/o M s/p orif left ankle 7/23/17 and I&D + ARSLAN of L ankle with wound vac placement on 9/19/17 signed out AMA returned for worsening left ankle pain. no fever. Normal WBC count.       PAST MEDICAL & SURGICAL HISTORY:  No pertinent past medical history  Fracture of left ankle      No Known Allergies      Meds:  influenza   Vaccine 0.5 milliLiter(s) IntraMuscular once  enoxaparin Injectable 40 milliGRAM(s) SubCutaneous daily  oxyCODONE    5 mG/acetaminophen 325 mG 1 Tablet(s) Oral every 4 hours PRN      SOCIAL HISTORY:  Smoker:  YES, some day smoker about 1/2 pack  Etoh: Socially    FAMILY HISTORY:  No pertinent family history in first degree relatives      VITALS:  Vital Signs Last 24 Hrs  T(C): 36.3 (25 Sep 2017 08:06), Max: 36.7 (25 Sep 2017 05:28)  T(F): 97.4 (25 Sep 2017 08:06), Max: 98.1 (25 Sep 2017 05:28)  HR: 69 (25 Sep 2017 08:06) (63 - 129)  BP: 115/68 (25 Sep 2017 08:06) (105/55 - 140/86)  BP(mean): --  RR: 16 (25 Sep 2017 08:06) (16 - 20)  SpO2: 99% (25 Sep 2017 08:06) (98% - 100%)    LABS/DIAGNOSTIC TESTS:                          14.8   7.3   )-----------( 203      ( 25 Sep 2017 02:19 )             43.4     WBC Count: 7.3 K/uL (09-25 @ 02:19)      09-25    140  |  105  |  17  ----------------------------<  109<H>  3.9   |  28  |  1.07    Ca    9.2      25 Sep 2017 02:19                PT/INR - ( 25 Sep 2017 02:19 )   PT: 11.8 sec;   INR: 1.08 ratio HPI:  23 y/o M s/p orif left ankle 7/23/17 and I&D + ARSLAN of L ankle with wound vac placement on 9/19/17 signed out AMA returned for worsening left ankle pain. no fever. Normal WBC count.       PAST MEDICAL & SURGICAL HISTORY:  No pertinent past medical history  Fracture of left ankle      No Known Allergies      Meds:  influenza   Vaccine 0.5 milliLiter(s) IntraMuscular once  enoxaparin Injectable 40 milliGRAM(s) SubCutaneous daily  oxyCODONE    5 mG/acetaminophen 325 mG 1 Tablet(s) Oral every 4 hours PRN      SOCIAL HISTORY:  Smoker:  YES, some day smoker about 1/2 pack  Etoh: Socially    FAMILY HISTORY:  No pertinent family history in first degree relatives      VITALS:  Vital Signs Last 24 Hrs  T(C): 36.3 (25 Sep 2017 08:06), Max: 36.7 (25 Sep 2017 05:28)  T(F): 97.4 (25 Sep 2017 08:06), Max: 98.1 (25 Sep 2017 05:28)  HR: 69 (25 Sep 2017 08:06) (63 - 129)  BP: 115/68 (25 Sep 2017 08:06) (105/55 - 140/86)  BP(mean): --  RR: 16 (25 Sep 2017 08:06) (16 - 20)  SpO2: 99% (25 Sep 2017 08:06) (98% - 100%)    LABS/DIAGNOSTIC TESTS:                          14.8   7.3   )-----------( 203      ( 25 Sep 2017 02:19 )             43.4     WBC Count: 7.3 K/uL (09-25 @ 02:19)      09-25    140  |  105  |  17  ----------------------------<  109<H>  3.9   |  28  |  1.07    Ca    9.2      25 Sep 2017 02:19                PT/INR - ( 25 Sep 2017 02:19 )   PT: 11.8 sec;   INR: 1.08 ratio HPI:  21 y/o M s/p orif left ankle 7/23/17 and I&D + ARSLAN of L ankle with wound vac placement on 9/19/17 signed out AMA returned for worsening left ankle pain, dizziness and chest pain. Ct negative for PE. no fever. Normal WBC count.       PAST MEDICAL & SURGICAL HISTORY:  No pertinent past medical history  Fracture of left ankle      No Known Allergies      Meds:  influenza   Vaccine 0.5 milliLiter(s) IntraMuscular once  enoxaparin Injectable 40 milliGRAM(s) SubCutaneous daily  oxyCODONE    5 mG/acetaminophen 325 mG 1 Tablet(s) Oral every 4 hours PRN      SOCIAL HISTORY:  Smoker:  YES, some day smoker about 1/2 pack  Etoh: Socially    FAMILY HISTORY:  No pertinent family history in first degree relatives      VITALS:  Vital Signs Last 24 Hrs  T(C): 36.3 (25 Sep 2017 08:06), Max: 36.7 (25 Sep 2017 05:28)  T(F): 97.4 (25 Sep 2017 08:06), Max: 98.1 (25 Sep 2017 05:28)  HR: 69 (25 Sep 2017 08:06) (63 - 129)  BP: 115/68 (25 Sep 2017 08:06) (105/55 - 140/86)  BP(mean): --  RR: 16 (25 Sep 2017 08:06) (16 - 20)  SpO2: 99% (25 Sep 2017 08:06) (98% - 100%)    LABS/DIAGNOSTIC TESTS:                          14.8   7.3   )-----------( 203      ( 25 Sep 2017 02:19 )             43.4     WBC Count: 7.3 K/uL (09-25 @ 02:19)      09-25    140  |  105  |  17  ----------------------------<  109<H>  3.9   |  28  |  1.07    Ca    9.2      25 Sep 2017 02:19    PT/INR - ( 25 Sep 2017 02:19 )   PT: 11.8 sec;   INR: 1.08 ratio      Radiology:    < from: CT Angio Chest w/ IV Cont (09.25.17 @ 04:56) >    EXAM:  CT ANGIO CHEST (W)AW IC                            PROCEDURE DATE:  09/25/2017          INTERPRETATION:  CLINICAL INFORMATION: Shortness of breath, chest pain,   assess PE.     PROCEDURE: CT angiography of the chest was performed with intravenous   contrast utilizing dedicated PE protocol. 45 mls of Omnipaque-350   administered without complication. 65 ml discarded. Coronal and sagittal   reconstruction images were obtained. Axial MIP images were obtained from   a separate workstation.      COMPARISON: None.    FINDINGS: The patient's respiratory motion artifact degrades images.    LUNGS AND AIRWAYS: Patent central airways. No focal consolidation.   PLEURA: No pleural effusion or pneumothorax. Minimal biapical pleural   thickening.  HEART: Normal size. No pericardial effusion.  VESSELS: Adequate contrast opacification of the pulmonary arterial trees.   No pulmonary embolus. Left-sided PICC line terminating in the cavoatrial   junction.     CHEST WALL AND LOWER NECK: Within normallimits.   MEDIASTINUM AND DAYSI: No lymphadenopathy.   UPPER ABDOMEN: Grossly unremarkable.   BONES: Unremarkable.     IMPRESSION:    No pulmonary embolus.                ROSALINE PETERS M.D., ATTENDING RADIOLOGIST  This document has been electronically signed. Sep 25 2017  5:16AM                < end of copied text >

## 2017-09-25 NOTE — ED ADULT NURSE REASSESSMENT NOTE - NS ED NURSE REASSESS COMMENT FT1
Pt. is in stable condition and verbalized some improvement to pain. Pt. is being admitted. No signs of distress noted. Plan of care and wait time explained.

## 2017-09-25 NOTE — ED PROVIDER NOTE - MEDICAL DECISION MAKING DETAILS
21 y/o M pt with recent surgery now coming in with SOB, chest pain and tachycardia. Will r/o PE versus opioid withdrawal versus pain induced tachycardia. obtain labs, CXR, fluids, EKG, Percocet and likely get a CTP.

## 2017-09-25 NOTE — CONSULT NOTE ADULT - ASSESSMENT
23y/o M s/p orif left ankle 7/23/17 and I&D + ARSLAN of L ankle with wound vac placement on 9/19/17 signed out AMA due to personal reasons, returned today due to worsening L ankle pain. Patients states he wants to get his Abx and be discharged ASAP. He states he has been walking with cam walker boot and crutches. Denies any fever/chills, N/V/D, paresthesias.  He reports at present that he is experiencing chest pain that on further detailed inquiry is totally reproducible on sternal palpation, particularly on the left costosternal junctions.      Chest Pain   2/2 costochondritis / musculoskeletal injury    -likely self limited  -consider trend of CK level given all likely-grissom of crush injury of recent  -NSAID therapy in conjunction with current pain management should help na d even mitigate opoid based analgesics  -Lidoderm trial can also be done  -incentive daja   -gi/dvt ppx
left ankle osteo /septic arthritis  plan - dc home today on rocephin 2 gms iv qd x 6 weeks  reconsult prn

## 2017-09-25 NOTE — H&P ADULT - NSHPPHYSICALEXAM_GEN_ALL_CORE
Wound vac intact at L ankle. + motion at all digits and ankle/ No CT and calves soft B/l. Sensation intact.

## 2017-09-25 NOTE — DISCHARGE NOTE ADULT - PATIENT PORTAL LINK FT
“You can access the Follow[employer] Patient Portal, offered by [facility], by registering with the following website: [email or web address]”

## 2017-09-25 NOTE — DISCHARGE NOTE ADULT - CARE PLAN
Principal Discharge DX:	Fracture of left ankle  Goal:	increase mobility  Instructions for follow-up, activity and diet:	wound assessment

## 2017-09-25 NOTE — CONSULT NOTE ADULT - RS GEN PE MLT RESP DETAILS PC
no chest wall tenderness/good air movement/respirations non-labored/airway patent/clear to auscultation bilaterally/breath sounds equal

## 2017-09-25 NOTE — ED PROVIDER NOTE - PROGRESS NOTE DETAILS
[facility]: ct pe neg.  spoke with pt and wants to go home but ok if MD wants pt kept.  Dr Bone notified and wants pt to be admitted.  admit to ortho per Dr Bone request. stable pain controlled

## 2017-09-25 NOTE — ED PROVIDER NOTE - RESPIRATORY, MLM
Breath sounds clear and equal bilaterally. Speaking full sentences. Clear to osculation. No retraction.

## 2017-09-25 NOTE — CONSULT NOTE ADULT - SUBJECTIVE AND OBJECTIVE BOX
ALEISHA ASKEW  MR# [id number]  22yMale        Patient is a 22y old  Male who presents with a chief complaint of L ankle pain (25 Sep 2017 12:20)    HPI  21y/o M s/p orif left ankle 7/23/17 and I&D + ARSLAN of L ankle with wound vac placement on 9/19/17 signed out AMA due to personal reasons, returned today due to worsening L ankle pain. Patients states he wants to get his Abx and be discharged ASAP. He states he has been walking with cam walker boot and crutches. Denies any fever/chills, N/V/D, paresthesias.  He reports at present that he is experiencing chest pain that on further detailed inquiry is totally reproducible on sternal palpation, particularly on the left costosternal junctions.      ALLERGIES  No Known Allergies      MEDICATIONS  influenza   Vaccine 0.5 milliLiter(s) IntraMuscular once  enoxaparin Injectable 40 milliGRAM(s) SubCutaneous daily  oxyCODONE    5 mG/acetaminophen 325 mG 1 Tablet(s) Oral every 4 hours PRN Moderate Pain (4 - 6)      PMH  None    PSH  Left Ankle ORIF        REVIEW OF SYSTEMS:  CONSTITUTIONAL: No fever, weight loss, or fatigue  EYES: No eye pain, visual disturbances, or discharge  ENT:  No difficulty hearing, tinnitus, vertigo; No sinus or throat pain  NECK: No pain or stiffness  RESPIRATORY: No cough, wheezing, chills or hemoptysis; No Shortness of Breath  CARDIOVASCULAR: No chest pain, palpitations, passing out, dizziness, or leg swelling  GASTROINTESTINAL: No abdominal or epigastric pain. No nausea, vomiting, or hematemesis; No diarrhea or constipation. No melena or hematochezia.  GENITOURINARY: No dysuria, frequency, hematuria, or incontinence  NEUROLOGICAL: No headaches, memory loss, loss of strength, numbness, or tremors  SKIN: No itching, burning, rashes, or lesions   LYMPH Nodes: No enlarged glands  ENDOCRINE: No heat or cold intolerance; No hair loss  MUSCULOSKELETAL: No joint pain or swelling; No muscle, back, or extremity pain  PSYCHIATRIC: No depression, anxiety, mood swings, or difficulty sleeping  HEME/LYMPH: No easy bruising, or bleeding gums  ALLERGY AND IMMUNOLOGIC: No hives or eczema	    [ ] All others negative	  [ ] Unable to obtain      T(C): 36.3 (09-25-17 @ 08:06), Max: 36.7 (09-25-17 @ 05:28)  T(F): 97.4 (09-25-17 @ 08:06), Max: 98.1 (09-25-17 @ 05:28)  HR: 54 (09-25-17 @ 11:30) (54 - 129)  BP: 102/43 (09-25-17 @ 11:30) (102/43 - 140/86)  RR: 16 (09-25-17 @ 08:06) (16 - 20)  SpO2: 99% (09-25-17 @ 08:06) (98% - 100%)  Wt(kg): --  Height (cm): 182.88 (09-25 @ 00:57)  Weight (kg): 70.3 (09-25 @ 00:57)  BMI (kg/m2): 21 (09-25 @ 00:57)  BSA (m2): 1.91 (09-25 @ 00:57)  I&O's Summary        PHYSICAL EXAM:  A X O x  HEAD:  Atraumatic, Normocephalic  EYES: EOMI, PERRLA, conjunctiva and sclera clear  NECK: Supple, No JVD, Normal thyroid  Resp: CTAB, No crackles, wheezing,   CVS: Regular rate and rhythm; No discernable murmurs, rubs, or gallops  ABD: Soft, Nontender, Nondistended; Bowel sounds present  EXTREMITIES:  2+ Peripheral Pulses, No edema  LYMPH: No dicernable lymphadenopathy noted  GENERAL: NAD, well-groomed, well-developed      LABS:                        14.8   7.3   )-----------( 203      ( 25 Sep 2017 02:19 )             43.4     09-25    140  |  105  |  17  ----------------------------<  109<H>  3.9   |  28  |  1.07    Ca    9.2      25 Sep 2017 02:19      PT/INR - ( 25 Sep 2017 02:19 )   PT: 11.8 sec;   INR: 1.08 ratio             CAPILLARY BLOOD GLUCOSE          Troponins:  ProBNP:  Lipid Profile:   HgA1c:  TSH:           RADIOLOGY & ADDITIONAL TESTS:    Imaging Personally Reviewed:  [ ] YES  [ ] NO      Consultant(s) Notes Reviewed:  [x ] YES  [ ] NO    Care Discussed with Consultants/Other Providers [ x] YES  [ ] NO          PAST MEDICAL & SURGICAL HISTORY:  No pertinent past medical history  Fracture of left ankle        Left ankle pain: PAIN IN LEFT ANKLE  No h/o HF  No pertinent family history in first degree relatives  MEWS Score: 0 (25-Sep-2017 12:56)  No pertinent past medical history  Fracture of left ankle  Fracture of left ankle  W-TROUBLE BREATHING: W-TROUBLE BREATHING  0

## 2017-09-26 DIAGNOSIS — B95.61 METHICILLIN SUSCEPTIBLE STAPHYLOCOCCUS AUREUS INFECTION AS THE CAUSE OF DISEASES CLASSIFIED ELSEWHERE: ICD-10-CM

## 2017-09-26 DIAGNOSIS — T81.31XD DISRUPTION OF EXTERNAL OPERATION (SURGICAL) WOUND, NOT ELSEWHERE CLASSIFIED, SUBSEQUENT ENCOUNTER: ICD-10-CM

## 2017-09-26 DIAGNOSIS — T84.59XD INFECTION AND INFLAMMATORY REACTION DUE TO OTHER INTERNAL JOINT PROSTHESIS, SUBSEQUENT ENCOUNTER: ICD-10-CM

## 2017-09-26 DIAGNOSIS — Z28.21 IMMUNIZATION NOT CARRIED OUT BECAUSE OF PATIENT REFUSAL: ICD-10-CM

## 2017-09-26 DIAGNOSIS — F17.210 NICOTINE DEPENDENCE, CIGARETTES, UNCOMPLICATED: ICD-10-CM

## 2017-09-26 DIAGNOSIS — M86.172 OTHER ACUTE OSTEOMYELITIS, LEFT ANKLE AND FOOT: ICD-10-CM

## 2017-09-26 LAB
CULTURE RESULTS: SIGNIFICANT CHANGE UP
SPECIMEN SOURCE: SIGNIFICANT CHANGE UP

## 2017-09-27 DIAGNOSIS — M25.572 PAIN IN LEFT ANKLE AND JOINTS OF LEFT FOOT: ICD-10-CM

## 2017-09-27 DIAGNOSIS — M94.0 CHONDROCOSTAL JUNCTION SYNDROME [TIETZE]: ICD-10-CM

## 2017-09-27 LAB
CULTURE RESULTS: SIGNIFICANT CHANGE UP
ORGANISM # SPEC MICROSCOPIC CNT: SIGNIFICANT CHANGE UP
ORGANISM # SPEC MICROSCOPIC CNT: SIGNIFICANT CHANGE UP
SPECIMEN SOURCE: SIGNIFICANT CHANGE UP

## 2019-04-22 NOTE — PHYSICAL THERAPY INITIAL EVALUATION ADULT - HEALTH SCREEN CRITERIA
yes GOAL: Pt will demonstrate improved static/dynamic standing balance to good, in order to improve stability, decrease fall risk and increase independence with ADLs within 4 weeks.

## 2021-11-10 NOTE — ED PROVIDER NOTE - LATERALITY
TOTAL KNEE ARTHROPLASTY  Procedure Report    Patient Name:  Jovany Ruiz  YOB: 1958    Date of Surgery:  11/10/2021     Indications: The patient has right knee osteoarthritis. He has failed conservative measures and wishes to undergo right knee replacement. Risks and benefits of the surgery were discussed. Risk of bleeding, infection, damage to neurovascular structures, heart attack, stroke, DVT/PE, anesthesia complications including death, continued pain disability, infection, periprosthetic fracture, among others were discussed. Informed consent was obtained and he wished to proceed.    Pre-op Diagnosis:   Osteoarthritis of right knee [M17.11]       Post-Op Diagnosis Codes:     * Osteoarthritis of right knee [M17.11]    Procedure/CPT® Codes:      Procedure(s):  RIGHT TOTAL KNEE ARTHROPLASTY    Staff:  Surgeon(s):  Lorenzo Cowan MD    Assistant: Daron Saleh PA    Anesthesia: General    Estimated Blood Loss: 75 cc    Implants:    Implant Name Type Inv. Item Serial No.  Lot No. LRB No. Used Action   CMT BONE PALACOS R HI/VISC 1X40 - [id number] Implant CMT BONE PALACOS R HI/VISC 1X40  [employer] [id number] Right 1 Implanted   CMT BONE PALACOS R HI/VISC 1X40 - [id number] Implant CMT BONE PALACOS R HI/VISC 1X40  [facility] [id number] Right 1 Implanted   PAT PERSONA ALLPOLY CMT 35MM - [id number] Implant PAT PERSONA ALLPOLY CMT 35MM  [employer] [id number] Right 1 Implanted   STEM TIB PERSONA CMT 5D SZG RT - [id number] Implant STEM TIB PERSONA CMT 5D SZG RT  [employer] [id number] Right 1 Implanted   COMP FEM/KN PERSONA CR CMT COCR STD SZ10 RT - [id number] Implant COMP FEM/KN PERSONA CR CMT COCR STD SZ10 RT  [employer] [id number] Right 1 Implanted   ART/SRF KN PERSONA/VE MC GH 8TO11 10MM RT - [id number] Implant ART/SRF KN PERSONA/VE MC GH 8TO11 10MM RT  [employer] [id number] Right 1 Implanted       Specimen:          Specimens     ID Source Type Tests  Collected By Collected At Frozen?    A Leg, Right Bone · TISSUE PATHOLOGY EXAM   Lorenzo Cowan MD 11/10/21 1006     Description: Proximal Tibia              Findings: Right knee osteoarthritis. Sclerotic serpiginous bone to proximal tibia sent for specimen.    Complications: None    Description of Procedure: The patient was brought to the operating room and placed supine on the OR table.  General anesthesia was given.  Preoperatively, the patient received an adductor canal nerve block. The patient was placed supine on the OR table.  All bony prominences were padded. The tourniquet was placed on the thigh. The affected lower extremity was prepped and draped in the usual sterile fashion. Preoperative antibiotic was given. Tranexamic acid intravenously was given at the beginning and the end of the surgery.  At this point, an Esmarch was used to exsanguinate the limb and the tourniquet was inflated. A longitudinal incision was made over the knee and a medial parapatellar arthrotomy was performed.  Appropriate releases were performed to the proximal medial tibia. The patellar fat pad was incised.  The patella was subluxed laterally and the knee was examined. There was severe tricompartmental wear and osteophyte formation.  The medial and lateral menisci were removed.  Osteophytes in the femur were debrided and intramedullary drill hole was made and a guide was placed in the femur.  A distal femoral cut was made at 5 degrees valgus angulation. At this point, we then sized the femur with posterior referencing with 3 degrees external rotation, pinning the cutting block into place.  The anterior, posterior, and chamfer cuts were made.  The bone was removed.  The tibia was then subluxed anteriorly and the extramedullary tibial guide was placed.  A 7 degree posterior slope was used.  This was pinned into place in alignment with the tibial crest and the second ray.  The proximal tibial cut was then made. There was some  abnormal bone to the proximal tibia. This was discovered and sclerotic. Question previous bone infarct. No signs of infection. This was sent for permanent specimen. The spacer block was able to fit well in flexion and extension. Therefore, the tibial pins were removed. We then placed the knee in flexion where laminar spreaders were used to open the flexion gaps.  The menisci were removed.  Osteophytes were removed from the posterior femur. The posterior capsule was injected with anesthetic cocktail.  At this point, the appropriately sized tibial tray was chosen.  This was pinned into place in line with the medial one third of the tibial tubercle. We then placed the trial femur. The trial insert was placed and the knee was brought to full extension. It was stable to varus and valgus stress.  We then everted the patella. It was resected and drilled, and a trial patella was placed. The patella tracked well with no evidence of instability.  The knee was then trialed with range of motion again. The femoral drill holes were made. The tibia was finished with the drill and the punch.  The components were removed. The knee was irrigated and dried. The cement was mixed and the tibia and femur were cemented into place.  The medial congruent spacer was then inserted.  The patella was cemented into place. The knee was irrigated with Irrisept and normal saline Pulsavac lavage.  The medial congruent polyethylene was inserted. The knee was stable to varus and valgus stress. There was good balance to the ligaments medially and laterally. There was good flexion with a stable patella. At this point, the tourniquet was released.  There was minimal bleeding controlled with electrocautery. The arthrotomy was closed with #1 Vicryl at 30 degrees of flexion, the subcutaneous tissues with 2-0 Vicryl, and the skin with staples.  An Aquacel dressing was placed and an Ace wrap was placed. Patient awoke from anesthesia in stable condition. There  were no complications. All counts were correct.           Assistant: Daron Saleh PA  was responsible for performing the following activities: Retraction, Suction, Irrigation and Placing Dressing and their skilled assistance was necessary for the success of this case.    Lorenzo Cowan MD     Date: 11/10/2021  Time: 10:54 EST     left

## 2021-11-12 NOTE — ED PROVIDER NOTE - CADM POA PRESS ULCER
Bed: 17  Expected date: 11/12/21  Expected time: 2:58 PM  Means of arrival: [facility]  Comments:  Covid, headache   No

## 2022-12-08 NOTE — ED PROVIDER NOTE - GASTROINTESTINAL, MLM
Abdomen soft, non-tender, no guarding. Curvilinear Excision Additional Text (Leave Blank If You Do Not Want): The margin was drawn around the clinically apparent lesion.  A curvilinear shape was then drawn on the skin incorporating the lesion and margins.  Incisions were then made along these lines to the appropriate tissue plane and the lesion was extirpated.

## 2024-03-21 NOTE — PATIENT PROFILE ADULT. - LONGEST PERIOD TOBACCO-FREE
Multifactorial due to ARF, UTI, chronic alcoholism, malnutrition with resultant deconditioning, debility, functional impaired mobility and balance. Treated underlying conditions with steady improvement, and continue therapy with PT/OT. Pt making good progress and is motivated, excellent rehab candidate. Will go to [facility] today     
Detail Level: Detailed
3 years

## 2024-11-23 NOTE — ED PROVIDER NOTE - CARE PLAN
Malnutrition Findings:   Adult Malnutrition type: Chronic illness  Adult Degree of Malnutrition: Malnutrition of moderate degree  Malnutrition Characteristics: Fat loss, Muscle loss                  360 Statement: Moderate malnutrition r/t inadequate intake as evidenced by BMI 18.0, mild fat/muscle wasting of orbital/temple areas. treated with high calorie diet and Ensure Compact tid    BMI Findings:  Adult BMI Classifications: Underweight < 18.5        Body mass index is 18 kg/m².      Principal Discharge DX:	Chronic pain of left ankle
